# Patient Record
Sex: FEMALE | Race: WHITE | NOT HISPANIC OR LATINO | Employment: OTHER | URBAN - METROPOLITAN AREA
[De-identification: names, ages, dates, MRNs, and addresses within clinical notes are randomized per-mention and may not be internally consistent; named-entity substitution may affect disease eponyms.]

---

## 2017-04-03 ENCOUNTER — COMMUNICATION - HEALTHEAST (OUTPATIENT)
Dept: INTERNAL MEDICINE | Facility: CLINIC | Age: 72
End: 2017-04-03

## 2017-04-03 DIAGNOSIS — E78.5 HYPERLIPIDEMIA: ICD-10-CM

## 2017-04-04 ENCOUNTER — TELEPHONE (OUTPATIENT)
Dept: NEUROLOGY | Facility: CLINIC | Age: 72
End: 2017-04-04

## 2017-04-04 DIAGNOSIS — G21.8 OTHER SECONDARY PARKINSONISM (H): ICD-10-CM

## 2017-04-04 NOTE — TELEPHONE ENCOUNTER
----- Message from Junie Cuenca LPN sent at 3/29/2017  8:48 AM CDT -----  Regarding: New sx  Contact: [phone]  Caller name:Reinaldo Saucedo [phone]    Treating provider/specialty:Lawrence    Nurse:    Best time to return call:    Message left?     Description of issue/question:  sx wt loss -155#; husb approx her wt at 130# now; syncope past yr.   Lethargy and slurred speech past yr;   In the past couple wks, hyperactivity and visual hallucinations which has caused increased activity and improved speech. Able to walk with more pep with  around the house now.   Talking to imaginary people and children. More manic but agitated when  doesn't take care of the imaginary little girl.   No change in C/L doses.    The only risk is when  leaves the house for errands, he is worried that she will be unsupervised plus up and about on her own.  Please address.    Has appt 5/4/2017 2:10 PM Ray Bravo MD

## 2017-04-04 NOTE — TELEPHONE ENCOUNTER
carbidopa-levodopa (SINEMET CR)  MG per tablet 45 tablet 3 2016  No   Sig: Take 1/2 tab at midnight     carbidopa-levodopa (SINEMET)  MG per tablet 540 tablet 3 2016  No   Si.5 TAB at 9AM, 1pm, and 5PM and  Take 1 to 1.5 tablets @ 9pm     donepezil (ARICEPT) 10 MG tablet 90 tablet 11 2016  No   Sig: Take  by mouth. 1 tab at 9pm     melatonin 3 MG tablet 90 tablet 3 2016  --   Si tablet at 9pm     pramipexole (MIRAPEX) 1 MG tablet 180 tablet 3 2016  No   Sig: Take one half tab (0.5mg) at 9am, 1pm and 5pm     QUEtiapine (SEROQUEL) 25 MG tablet 135 tablet 3 2016  No   Si/2 tab @ 1pm and 1 tablet at 12 midnight     selegiline (ELDEPRYL) 5 MG capsule 180 capsule 3 2016  No   Si tab @ 7am, 1 tab @ 1pm     Called  and LVM to call me with a good time to reach him tomorrow. I want to verify medications.

## 2017-04-07 RX ORDER — PRAMIPEXOLE DIHYDROCHLORIDE 1 MG/1
TABLET ORAL
Qty: 1 TABLET | Refills: 0 | COMMUNITY
Start: 2017-04-07 | End: 2017-07-11 | Stop reason: SINTOL

## 2017-04-07 NOTE — TELEPHONE ENCOUNTER
LVM for patient's  with the following instructions:    pramipexole (MIRAPEX) 1 MG tablet 1 tablet 0 4/7/2017  No   Sig: Take 1/2 tab at 9 am and 1 pm for 5 days, then 1/2 tab at 9 am for 5 days, then stop.     Asked him to call me back letting me know he got these instructions.

## 2017-04-13 NOTE — TELEPHONE ENCOUNTER
----- Message from Junie Cuenca LPN sent at 4/7/2017 10:19 AM CDT -----  Regarding: pt update  Caller name:    Treating provider/specialty:Lawrence    Nurse:    Best time to return call:    Message left?FYI only     Description of issue/question:  Pt better-no manic behavior now but still hallucinating.   understanding taper instructions.

## 2017-04-26 NOTE — TELEPHONE ENCOUNTER
I called Reinaldo, at Perlita's request, to follow up and see how Jerica is doing now having tapered off the pramipexole, but was there was no answer; I left [facility] asking for him to call back and let our triage nurse know when I can reach him back to discuss, or leave a message with our triage nurse to get over to our team.    Franci Ingram, MS RN Care Coordinator

## 2017-04-26 NOTE — TELEPHONE ENCOUNTER
"Reinaldo called us back advising that he would be available at any time to talk; I called Reinaldo back to get an update on Jerica; He said that Jerica's hallucinations have disappeared, if not almost gone completely; While she was tapering down the pramipexole, he started to notice improvement, but that she also had more \"pep\"; During the tapering, she also had a period of more agitation, which he said is better now, and he described it more like mood swings; Reinaldo said that ever since Dr Bravo put Jerica on the \"BP medication\", she hasn't been fainting anymore; She also apparently walked all the way upstairs in their home on her own, which she hasn't done in a long time; Jerica is scheduled to see Dr Bravo next week on May 4th, and they are planning on coming; Reinaldo is most worried about trying to get her in the Dr Hunter, as that has apparently been an issue in getting her to the eye doctor and dentist, and they had to cancel last minute; Reinaldo also wanted us to make note that Jerica's speech is more clear now; Not all the time, and is a little worse when she is tired, but wanted Dr Bravo to know that; He had no further questions at this time.    Franci Ingram, MS RN Care Coordinator  "

## 2017-05-08 ENCOUNTER — TELEPHONE (OUTPATIENT)
Dept: NEUROLOGY | Facility: CLINIC | Age: 72
End: 2017-05-08

## 2017-05-08 NOTE — TELEPHONE ENCOUNTER
Called patient's  Uday and let him know that I received his voice mail last week. Advised him to call the scheduling line to reschedule patient's appointment before September as Dr. Bravo needs to see her yearly in order to write prescriptions. She should be off of the pramipexole by now and having improvement with hallucinations and fatigue.

## 2017-05-22 ENCOUNTER — COMMUNICATION - HEALTHEAST (OUTPATIENT)
Dept: CARDIOLOGY | Facility: CLINIC | Age: 72
End: 2017-05-22

## 2017-05-22 DIAGNOSIS — I95.1 PARKINSONIAN SYNDROME ASSOCIATED WITH SYMPTOMATIC ORTHOSTATIC HYPOTENSION (H): ICD-10-CM

## 2017-05-22 DIAGNOSIS — G20.C PARKINSONIAN SYNDROME ASSOCIATED WITH SYMPTOMATIC ORTHOSTATIC HYPOTENSION (H): ICD-10-CM

## 2017-05-30 ENCOUNTER — OFFICE VISIT (OUTPATIENT)
Dept: NEUROLOGY | Facility: CLINIC | Age: 72
End: 2017-05-30

## 2017-05-30 ENCOUNTER — RECORDS - HEALTHEAST (OUTPATIENT)
Dept: ADMINISTRATIVE | Facility: OTHER | Age: 72
End: 2017-05-30

## 2017-05-30 VITALS
BODY MASS INDEX: 25.16 KG/M2 | OXYGEN SATURATION: 97 % | DIASTOLIC BLOOD PRESSURE: 50 MMHG | HEIGHT: 65 IN | HEART RATE: 74 BPM | WEIGHT: 151 LBS | RESPIRATION RATE: 20 BRPM | SYSTOLIC BLOOD PRESSURE: 84 MMHG

## 2017-05-30 DIAGNOSIS — G20.A1 PARALYSIS AGITANS (H): ICD-10-CM

## 2017-05-30 DIAGNOSIS — G20.A1 PARKINSON DISEASE (H): ICD-10-CM

## 2017-05-30 DIAGNOSIS — G20.A1 PARKINSON'S DISEASE (H): ICD-10-CM

## 2017-05-30 RX ORDER — CARBIDOPA AND LEVODOPA 50; 200 MG/1; MG/1
TABLET, EXTENDED RELEASE ORAL
Qty: 45 TABLET | Refills: 3 | Status: SHIPPED | OUTPATIENT
Start: 2017-05-30 | End: 2017-11-30

## 2017-05-30 RX ORDER — CARBIDOPA AND LEVODOPA 25; 100 MG/1; MG/1
TABLET ORAL
Qty: 540 TABLET | Refills: 3 | Status: SHIPPED | OUTPATIENT
Start: 2017-05-30 | End: 2017-11-30

## 2017-05-30 RX ORDER — DONEPEZIL HYDROCHLORIDE 10 MG/1
TABLET, FILM COATED ORAL
Qty: 90 TABLET | Refills: 11 | Status: SHIPPED | OUTPATIENT
Start: 2017-05-30 | End: 2017-11-30

## 2017-05-30 RX ORDER — LANOLIN ALCOHOL/MO/W.PET/CERES
CREAM (GRAM) TOPICAL
Qty: 90 TABLET | Refills: 3 | Status: SHIPPED | OUTPATIENT
Start: 2017-05-30 | End: 2017-11-30

## 2017-05-30 RX ORDER — QUETIAPINE FUMARATE 25 MG/1
TABLET, FILM COATED ORAL
Qty: 135 TABLET | Refills: 3 | Status: SHIPPED | OUTPATIENT
Start: 2017-05-30 | End: 2017-11-30

## 2017-05-30 ASSESSMENT — PAIN SCALES - GENERAL: PAINLEVEL: NO PAIN (0)

## 2017-05-30 NOTE — Clinical Note
2017       RE: Jerica Blas  [address] [facility] [address]     Dear Colleague,    Thank you for referring your patient, Jerica Blas, to the [facility] NEUROLOGY at [facility]. Please see a copy of my visit note below.    Diagnosis/Summary/Recommendations:    PATIENT: Jerica Blas    : 1945    PANKAJ: May 30, 2017    Parkinsonism  Cognitive impairment      ______________________________________    Cc: 71 year old female   Issues discussed today May 30, 2017       -       Over 50% of this visit was spent in patient care and care coordination.     History obtained from patient and family    Total visit time was 25 minutes      Ray Bravo MD     ______________________________________    Last visit date and details:      Parkinsonism  Sinemet 25/100: 1.5 tbs @ 7a, 11a, 4pm, and 1-1.5 tabs @7pm  Sinemet 50/200:  1/2 at bedtime   mirapex 1mg 1/2 tab @ 7a, 11 and 4pm  Selegiline 5mg in am and noon.     Cognitive disorder  Donepezil 10mg /day     Hallucinations  seroquel 25mg 1/2 tab @ 11a and 1 tab @ 10pm     On proamatine/midodrine  She is on 2.5mg three times per day   She had a very low  bp today when seated  She ha had orthostatic drops when seen by Dr. Villagomez  122/70 seated and 70/50 standing.                 Fabby Logan, [facility] at 10/31/2016  2:26 PM       Status: Signed              Expand All Collapse All    Social Work:  D/I: Request to contact Pt's dtr Lily re home care services. Lily indicated that her father was paying for retirement home care (HHA) to provide respite care while he went fishing or golfing during the summer. It is too costly for him to continue it thru the winter. She wondered if Medicare would pay for this. Discussed skilled home care criteria under Medicare coverage. She has not had this before and may benefit from it at some time, [facility] PT/OT. Discussed the limitations of Medicare coverage home care and that most people  who need detention care (bathing dressing grooming, mobility, etc) either need to pay for it from their own savings or apply to services thru their county to have it funded thru the [facility]. Discussed getting a Long Term Care consultation visit at home. Discussed [employer] and they are over assets for that program. She inquired about whether I could contact her father to discuss this. Dtr reported that she and her sibling both have young children and it's difficult for them to help in the care of Jerica.  I contacted Reinaldo. Acknowledged the challenges in 24/7 caregiving. We discussed home care funding and he was aware of not qualifying for funding and is trying to preserve their finances for the future. They have some services from [facility] home care that he used this summer and it came with a nurse who visited and did check Jerica's blood pressure. Discussed the LTC assessment thru [facility] to get the asset assessment done and clarity on qualifications for any programs. He plans to wait for now. Discussed Medicare home care and that they could use it when needed in the future for skilled visits.  A/P: Caregiver fatigue. He has to determine how best to use their funds.  He has my contact info and encouraged him to contact me as needed in the future.                SUMMARY  She continues to be relatively stable in regards to her mobility     Her blood pressure seems to be better managed with her proamatine 2.5mg 3/day  Discussed other options for blood pressure if needed  A. Fluid bolus of 8oz or 16oz before activities  B. Extra proamatine if needed in the morning or mid day  C. Mestinon add on  D. Florinef/fludrocortisone  Her  will decide if he will see nephrologist Dr. Yuan Mcdonald about her BP/renal issues.      At this point she and her  seem okay with the present medication regimen and is not wanting to change thins at this poin     Her regards to mobility she will remain on her sinemet,  sinemet CR, mirapex and selegiline  If there are significant changes in function we may consider backing down off the mirapex in the future. And consider having her go off the selegiline. But we both agreed to keep the regimen the same     In regards to her hallucinations these continue but are not troubling and she is on seroquel and not needing to have a dose increase. We discussed nuplazid/pimavanserin as a recently approved medication for these issues.      There is some respite one day a week during the summer so he can go fishing.     Return back in 6 months.      Ray Bravo MD        ______________________________________      Patient was asked about 14 Review of systems including changes in vision (dry eyes, double vision), hearing, heart, lungs, musculoskeletal, depression, anxiety, snoring, RBD, insomnia, urinary frequency, urinary urgency, constipation, swallowing problems, hematological, ID, allergies, skin problems: seborrhea, endocrinological: thyroid, diabetes, cholesterol; balance, weight changes, and other neurological problems and these were not significant at this time except for   Patient Active Problem List   Diagnosis     Parkinsonism (H)     Dementia     Crohn disease (H)     Fistula     Nocturia     REM sleep behavior disorder     Over weight     Vitamin D deficiency     Wears glasses     Glaucoma     Hypercholesteremia     Constipation     Finger fracture     Foot fracture     Confusion     Bunion     Cracked skin     Hand pain     Macrocytosis     Chronic renal disease          Allergies   Allergen Reactions     Demerol      Hay Fever & [A.R.M.]      Namenda [Memantine Hydrochloride]      No benefit       Requip [Ropinirole Hydrochloride]      ? confusion     Past Surgical History:   Procedure Laterality Date     BIOPSY OF SKIN LESION  october 2014    seborrheic keratosis of left face     EYE SURGERY  jan 2012    left cataract     EYE SURGERY  march 2012    right cataract     fistula  repair?       Past Medical History:   Diagnosis Date     Bunion 3/27/2012     Chronic renal disease 2013     Confusion 2011     Constipation     1/week     Constipation 2011     Cracked skin 3/27/2012     Crohn disease (H) 2011     Crohn's     mercaptopurine     Disorientation     denies hallucinations. Has memory problems     Entero-colonic fistula     related to crohns; [facility]; had a prior one      Falls     once every 3 months     Finger fracture 2011     Finger fracture, right      Fistula 2011     Foot fracture      Freezing      Glaucoma     uses travatan and timolol     Glaucoma 2011     Hand pain 3/27/2012     Hypercholesteremia 2011     Hypercholesterolaemia     on simvastatin     Macrocytosis 2013     Nocturia     1-3/noc     Over weight     with pd has gained 55 lbs     Parkinson's disease     left side onset; left handed. dyskinesias     REM sleep behavior disorder     not frequent     Screening colonoscopy      Vitamin D deficiency      Wears glasses      Social History     Social History     Marital status:      Spouse name: N/A     Number of children: N/A     Years of education: N/A     Occupational History     Not on file.     Social History Main Topics     Smoking status: Never Smoker     Smokeless tobacco: Never Used     Alcohol use No     Drug use: Not on file     Sexual activity: Not on file     Other Topics Concern     Not on file     Social History Narrative    Living in [place]    Not exercising    No tobacco    No alcohol        FAMILY HISTORY:  Her father  in his 70s from alcoholism.  Her mother is alive and age 94.  She has two sisters and one brother who are healthy.  She has two daughters who are 33 and 35.  She is of [place] background.        SOCIAL HISTORY:  She was born in [place] and grew up in the [place] then [place].  She came to [place] after college.  She has been  since , i.e., 40  years.  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       Drug and lactation database from the [place] National Library of Medicine:  http://toxnet.nlm.nih.gov/cgi-bin/sis/htmlgen?LACT      B/P: Data Unavailable, T: Data Unavailable, P: Data Unavailable, R: Data Unavailable 0 lbs 0 oz  There were no vitals taken for this visit., There is no height or weight on file to calculate BMI.  Medications and Vitals not listed are documented in the cart and reviewed by me.     Current Outpatient Prescriptions   Medication Sig Dispense Refill     pramipexole (MIRAPEX) 1 MG tablet Take 1/2 tab at 9 am and 1 pm for 5 days, then 1/2 tab at 9 am for 5 days, then stop. 1 tablet 0     midodrine (PROAMATINE) 2.5 MG tablet 1 tab @ 9am, 1pm and 5pm 270 tablet 3     melatonin 3 MG tablet 1 tablet at 9pm 90 tablet 3     carbidopa-levodopa (SINEMET CR)  MG per tablet Take 1/2 tab at midnight 45 tablet 3     carbidopa-levodopa (SINEMET)  MG per tablet 1.5 TAB at 9AM, 1pm, and 5PM and  Take 1 to 1.5 tablets @ 9pm 540 tablet 3     QUEtiapine (SEROQUEL) 25 MG tablet 1/2 tab @ 1pm and 1 tablet at 12 midnight 135 tablet 3     donepezil (ARICEPT) 10 MG tablet Take  by mouth. 1 tab at 9pm 90 tablet 11     selegiline (ELDEPRYL) 5 MG capsule 1 tab @ 7am, 1 tab @ 1pm 180 capsule 3     Urea (CARMOL 40) 40 % CREA Externally apply  topically. Apply on cracked skin as directed - 1 Tube 11     sennosides (SENNA) 8.6 MG tablet Take 1 tablet by mouth daily as needed for constipation. 100 tablet 3     Calcium Carb-Cholecalciferol (CALCIUM 500 +D PO) Take 1 tablet by mouth daily.       simvastatin (ZOCOR) 20 MG tablet Take 0.5 tablets by mouth At Bedtime.       cholecalciferol (VITAMIN D) 1000 UNIT tablet Take 3 tablets by mouth daily.       mercaptopurine (PURINETHOL) 50 MG tablet Take 1 tablet by mouth daily. 90 tablet 3         Ray Bravo MD    Again, thank you for allowing me to participate  in the care of your patient.      Sincerely,    Ray Bravo MD

## 2017-05-30 NOTE — PATIENT INSTRUCTIONS
Diagnosis/Summary/Recommendations:    PATIENT: Jerica Blas    : 1945    PANKAJ: May 30, 2017    Parkinsonism  Sinemet 50/200 1/2 tab at midnight  Sinemet 25/100  1.5 TABS @9-10AM, 1.5 TABS @1-2PM AND 1.5 TABS @5-6PM AND 1 TAB @ 9-10PM   Selegiline 5MG - 2 TABS @ 9-10AM   mirapex 1mg WEANED OFF THIS.     Cognitive impairment  Donepezil 10mg  AT 9-10PM    Orthostatic hypotension  Proamatine/midodrine 2.5mg 3/day @ 9AM, 5PM AND 12 MIDNIGHT.     Hallucinations  seroquel 25mg 1 /2 TAB @ 1-2PM AND 1 TAB @ MIDNIGHT.     Cholesterol  Simvastatin 1/2 TAB AT BEDTIME    MERCAPTOPURINE 50MG IN THE MORNING.     She has had spells where she stops with swallowing beverages.     Plan  Change the midodrine at midnight to 1-2pm so it will be 1 tab @ 9-10am, 1 tab @ 1-2pm and 1 tab @ 5-6pm    Secondly change the selegiline from 2 tabs @ 9-10am to 1 tab @ 9-10am and 1 tab @ 1-2pm    No other changes  Remain off the mirapex  Continue the other medications as written.     Return back in 6 months.    Probably has a lipoma on the back of her neck.     Ray garcia MD     _______

## 2017-05-30 NOTE — MR AVS SNAPSHOT
After Visit Summary   2017    Jerica Blas    MRN: [id number]           Patient Information     Date Of Birth          1945        Visit Information        Provider Department      2017 2:40 PM Ray Bravo MD [facility] Neurology        Today's Diagnoses     Paralysis agitans (H)        Parkinson's disease (H)        Parkinson disease (H)          Care Instructions    Diagnosis/Summary/Recommendations:    PATIENT: Jerica Blas    : 1945    PANKAJ: May 30, 2017    Parkinsonism  Sinemet 50/200 1/2 tab at midnight  Sinemet 25/100  1.5 TABS @9-10AM, 1.5 TABS @1-2PM AND 1.5 TABS @5-6PM AND 1 TAB @ 9-10PM   Selegiline 5MG - 2 TABS @ 9-10AM   mirapex 1mg WEANED OFF THIS.     Cognitive impairment  Donepezil 10mg  AT 9-10PM    Orthostatic hypotension  Proamatine/midodrine 2.5mg 3/day @ 9AM, 5PM AND 12 MIDNIGHT.     Hallucinations  seroquel 25mg 1 /2 TAB @ 1-2PM AND 1 TAB @ MIDNIGHT.     Cholesterol  Simvastatin 1/2 TAB AT BEDTIME    MERCAPTOPURINE 50MG IN THE MORNING.     She has had spells where she stops with swallowing beverages.     Plan  Change the midodrine at midnight to 1-2pm so it will be 1 tab @ 9-10am, 1 tab @ 1-2pm and 1 tab @ 5-6pm    Secondly change the selegiline from 2 tabs @ 9-10am to 1 tab @ 9-10am and 1 tab @ 1-2pm    No other changes  Remain off the mirapex  Continue the other medications as written.     Return back in 6 months.    Probably has a lipoma on the back of her neck.     Ray bravo MD     _______            Follow-ups after your visit        Follow-up notes from your care team     Return in about 6 months (around 2017).      Your next 10 appointments already scheduled     2017  2:10 PM CST   (Arrive by 1:55 PM)   Return Movement Disorder with Ray Bravo MD   [facility] ([facility])    [address] [place] [id number] [phone]              Who to contact     Please  "call your clinic at [phone] to:    Ask questions about your health    Make or cancel appointments    Discuss your medicines    Learn about your test results    Speak to your doctor   If you have compliments or concerns about an experience at your clinic, or if you wish to file a complaint, please contact [facility] Patient Relations at [phone] or email us at [email or web address]         Additional Information About Your Visit        .comhart Information     Pay[employer]ilt gives you secure access to your electronic health record. If you see a primary care provider, you can also send messages to your care team and make appointments. If you have questions, please call your primary care clinic.  If you do not have a primary care provider, please call [phone] and they will assist you.      Jemstep is an electronic gateway that provides easy, online access to your medical records. With Jemstep, you can request a clinic appointment, read your test results, renew a prescription or communicate with your care team.     To access your existing account, please contact your [facility] or call [phone] for assistance.        Care EveryWhere ID     This is your Care EveryWhere ID. This could be used by other organizations to access your [facility] medical records  [id number]        Your Vitals Were     Pulse Respirations Height Pulse Oximetry Breastfeeding? BMI (Body Mass Index)    74 20 1.651 m (5' 5\") 97% No 25.13 kg/m2       Blood Pressure from Last 3 Encounters:   05/30/17 (!) 84/50   11/01/16 (!) 78/50   04/22/16 (!) 80/44    Weight from Last 3 Encounters:   05/30/17 68.5 kg (151 lb)   04/22/16 71 kg (156 lb 9.6 oz)   10/22/15 81.6 kg (180 lb)              Today, you had the following     No orders found for display         Where to get your medicines      These medications were sent to [id number] - [place], [place] - [address]  [address], [facility] [address]    Hours:  Ok's by kailee GUPTA 9/20/06 Phone:  [phone]     carbidopa-levodopa  MG per tablet    carbidopa-levodopa  MG per CR tablet    donepezil 10 MG tablet    QUEtiapine 25 MG tablet         Some of these will need a paper prescription and others can be bought over the counter.  Ask your nurse if you have questions.     Bring a paper prescription for each of these medications     melatonin 3 MG tablet          Primary Care Provider Office Phone # Fax #    Faizan Villagomez [phone]       [facility] [address] [facility] [address]  [facility] [address]        Thank you!     Thank you for choosing [facility] NEUROLOGY  for your care. Our goal is always to provide you with excellent care. Hearing back from our patients is one way we can continue to improve our services. Please take a few minutes to complete the written survey that you may receive in the mail after your visit with us. Thank you!             Your Updated Medication List - Protect others around you: Learn how to safely use, store and throw away your medicines at www.disposemymeds.org.          This list is accurate as of: 5/30/17  3:16 PM.  Always use your most recent med list.                   Brand Name Dispense Instructions for use    CALCIUM 500 +D PO      Take 1 tablet by mouth daily.       * carbidopa-levodopa  MG per tablet    SINEMET    540 tablet    1.5 TAB at 9AM, 1pm, and 5PM and  Take 1 to 1.5 tablets @ 9pm       * carbidopa-levodopa  MG per CR tablet    SINEMET CR    45 tablet    Take 1/2 tab at midnight       cholecalciferol 1000 UNIT tablet    vitamin D     Take 3 tablets by mouth daily.       donepezil 10 MG tablet    ARICEPT    90 tablet    Take  by mouth. 1 tab at 9pm       melatonin 3 MG tablet     90 tablet    1 tablet at 9pm       mercaptopurine 50 MG tablet CHEMO    PURINETHOL    90 tablet    Take 1 tablet by mouth daily.        midodrine 2.5 MG tablet    PROAMATINE    270 tablet    1 tab @ 9am, 1pm and 5pm       pramipexole 1 MG tablet    MIRAPEX    1 tablet    Take 1/2 tab at 9 am and 1 pm for 5 days, then 1/2 tab at 9 am for 5 days, then stop.       QUEtiapine 25 MG tablet    SEROQUEL    135 tablet    1/2 tab @ 1pm and 1 tablet at 12 midnight       selegiline 5 MG capsule    ELDEPRYL    180 capsule    1 tab @ 7am, 1 tab @ 1pm       sennosides 8.6 MG tablet    SENOKOT    100 tablet    Take 1 tablet by mouth daily as needed for constipation.       Urea 40 % Crea    CARMOL 40    1 Tube    Externally apply  topically. Apply on cracked skin as directed -       ZOCOR 20 MG tablet   Generic drug:  simvastatin      Take 0.5 tablets by mouth At Bedtime.       * Notice:  This list has 2 medication(s) that are the same as other medications prescribed for you. Read the directions carefully, and ask your doctor or other care provider to review them with you.

## 2017-05-30 NOTE — PROGRESS NOTES
Diagnosis/Summary/Recommendations:    PATIENT: Jerica Blas    : 1945    PANKAJ: May 30, 2017    Parkinsonism  Sinemet 50/200 1/2 tab at midnight  Sinemet 25/100  1.5 TABS @9-10AM, 1.5 TABS @1-2PM AND 1.5 TABS @5-6PM AND 1 TAB @ 9-10PM   Selegiline 5MG - 2 TABS @ 9-10AM   mirapex 1mg WEANED OFF THIS.     Cognitive impairment  Donepezil 10mg  AT 9-10PM    Orthostatic hypotension  Proamatine/midodrine 2.5mg 3/day @ 9AM, 5PM AND 12 MIDNIGHT.     Hallucinations  seroquel 25mg 1 /2 TAB @ 1-2PM AND 1 TAB @ MIDNIGHT.     Cholesterol  Simvastatin 1/2 TAB AT BEDTIME    MERCAPTOPURINE 50MG IN THE MORNING.     She has had spells where she stops with swallowing beverages.     Plan  Change the midodrine at midnight to 1-2pm so it will be 1 tab @ 9-10am, 1 tab @ 1-2pm and 1 tab @ 5-6pm    Secondly change the selegiline from 2 tabs @ 9-10am to 1 tab @ 9-10am and 1 tab @ 1-2pm    No other changes  Remain off the mirapex  Continue the other medications as written.     Return back in 6 months.    Probably has a lipoma on the back of her neck.     Ray bravo MD     ______________________________________    Cc: 71 year old female   Issues discussed today May 30, 2017       -     Answers for HPI/ROS submitted by the patient on 2017   General Symptoms: No  Skin Symptoms: No  HENT Symptoms: No  EYE SYMPTOMS: No  HEART SYMPTOMS: No  LUNG SYMPTOMS: No  INTESTINAL SYMPTOMS: No  URINARY SYMPTOMS: No  GYNECOLOGIC SYMPTOMS: No  BREAST SYMPTOMS: No  SKELETAL SYMPTOMS: No  BLOOD SYMPTOMS: No  NERVOUS SYSTEM SYMPTOMS: No  MENTAL HEALTH SYMPTOMS: No          Over 50% of this visit was spent in patient care and care coordination.     History obtained from patient and family    Total visit time was 25 minutes      Ray Bravo MD     ______________________________________    Last visit date and details:      Parkinsonism  Sinemet 25/100: 1.5 tbs @ 7a, 11a, 4pm, and 1-1.5 tabs @7pm  Sinemet 50/200:  1/2 at bedtime   mirapex 1mg  tab @  7a, 11 and 4pm  Selegiline 5mg in am and noon.     Cognitive disorder  Donepezil 10mg /day     Hallucinations  seroquel 25mg 1/2 tab @ 11a and 1 tab @ 10pm     On proamatine/midodrine  She is on 2.5mg three times per day   She had a very low  bp today when seated  She ha had orthostatic drops when seen by Dr. Villagomez  122/70 seated and 70/50 standing.                 Fabby Logan, [facility] at 10/31/2016  2:26 PM       Status: Signed              Expand All Collapse All    Social Work:  D/I: Request to contact Pt's dtr Lily re home care services. Lily indicated that her father was paying for [facility] ([facility]) to provide respite care while he went fishing or golfing during the summer. It is too costly for him to continue it thru the winter. She wondered if Medicare would pay for this. Discussed skilled home care criteria under Medicare coverage. She has not had this before and may benefit from it at some time, [facility] home PT/OT. Discussed the limitations of Medicare coverage home care and that most people who need [facility] care (bathing dressing grooming, mobility, etc) either need to pay for it from their own savings or apply to services thru their [facility] to have it funded thru the [facility]. Discussed getting a Long Term Care consultation visit at home. Discussed [facility] and they are over assets for that program. She inquired about whether I could contact her father to discuss this. Dtr reported that she and her sibling both have young children and it's difficult for them to help in the care of Jerica.  I contacted Reinaldo. Acknowledged the challenges in 24/7 caregiving. We discussed home care funding and he was aware of not qualifying for funding and is trying to preserve their finances for the future. They have some services from [facility] home care that he used this summer and it came with a nurse who visited and did check Jerica's blood pressure. Discussed the LTC assessment thru [facility] to get the  asset assessment done and clarity on qualifications for any programs. He plans to wait for now. Discussed Medicare home care and that they could use it when needed in the future for skilled visits.  A/P: Caregiver fatigue. He has to determine how best to use their funds.  He has my contact info and encouraged him to contact me as needed in the future.                SUMMARY  She continues to be relatively stable in regards to her mobility     Her blood pressure seems to be better managed with her proamatine 2.5mg 3/day  Discussed other options for blood pressure if needed  A. Fluid bolus of 8oz or 16oz before activities  B. Extra proamatine if needed in the morning or mid day  C. Mestinon add on  D. Florinef/fludrocortisone  Her  will decide if he will see nephrologist Dr. Yuan Mcdonald about her BP/renal issues.      At this point she and her  seem okay with the present medication regimen and is not wanting to change thins at this poin     Her regards to mobility she will remain on her sinemet, sinemet CR, mirapex and selegiline  If there are significant changes in function we may consider backing down off the mirapex in the future. And consider having her go off the selegiline. But we both agreed to keep the regimen the same     In regards to her hallucinations these continue but are not troubling and she is on seroquel and not needing to have a dose increase. We discussed nuplazid/pimavanserin as a recently approved medication for these issues.      There is some respite one day a week during the summer so he can go fishing.     Return back in 6 months.      Ray Bravo MD        ______________________________________      Patient was asked about 14 Review of systems including changes in vision (dry eyes, double vision), hearing, heart, lungs, musculoskeletal, depression, anxiety, snoring, RBD, insomnia, urinary frequency, urinary urgency, constipation, swallowing problems, hematological, ID, allergies,  skin problems: seborrhea, endocrinological: thyroid, diabetes, cholesterol; balance, weight changes, and other neurological problems and these were not significant at this time except for   Patient Active Problem List   Diagnosis     Parkinsonism (H)     Dementia     Crohn disease (H)     Fistula     Nocturia     REM sleep behavior disorder     Over weight     Vitamin D deficiency     Wears glasses     Glaucoma     Hypercholesteremia     Constipation     Finger fracture     Foot fracture     Confusion     Bunion     Cracked skin     Hand pain     Macrocytosis     Chronic renal disease          Allergies   Allergen Reactions     Demerol      Hay Fever & [A.R.M.]      Namenda [Memantine Hydrochloride]      No benefit       Requip [Ropinirole Hydrochloride]      ? confusion     Past Surgical History:   Procedure Laterality Date     BIOPSY OF SKIN LESION  october 2014    seborrheic keratosis of left face     EYE SURGERY  jan 2012    left cataract     EYE SURGERY  march 2012    right cataract     fistula repair?       Past Medical History:   Diagnosis Date     Bunion 3/27/2012     Chronic renal disease 9/24/2013     Confusion 9/6/2011     Constipation     1/week     Constipation 9/6/2011     Cracked skin 3/27/2012     Crohn disease (H) 9/2/2011     Crohn's     mercaptopurine     Disorientation     denies hallucinations. Has memory problems     Entero-colonic fistula 2000    related to crohns; [facility]; had a prior one      Falls     once every 3 months     Finger fracture 9/6/2011     Finger fracture, right      Fistula 9/2/2011     Foot fracture      Freezing      Glaucoma     uses travatan and timolol     Glaucoma 9/6/2011     Hand pain 3/27/2012     Hypercholesteremia 9/6/2011     Hypercholesterolaemia     on simvastatin     Macrocytosis 9/24/2013     Nocturia     1-3/noc     Over weight     with pd has gained 55 lbs     Parkinson's disease 1998    left side onset; left handed. dyskinesias     REM sleep behavior  disorder     not frequent     Screening colonoscopy      Vitamin D deficiency      Wears glasses      Social History     Social History     Marital status:      Spouse name: N/A     Number of children: N/A     Years of education: N/A     Occupational History     Not on file.     Social History Main Topics     Smoking status: Never Smoker     Smokeless tobacco: Never Used     Alcohol use No     Drug use: Not on file     Sexual activity: Not on file     Other Topics Concern     Not on file     Social History Narrative    Living in [place]    Not exercising    No tobacco    No alcohol        FAMILY HISTORY:  Her father  in his 70s from alcoholism.  Her mother is alive and age 94.  She has two sisters and one brother who are healthy.  She has two daughters who are 33 and 35.  She is of [place] background.        SOCIAL HISTORY:  She was born in [place] and grew up in the [place] and then [place].  She came to [place] after college.  She has been  since , i.e., 39 years.  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       Drug and lactation database from the [place] National Library of Medicine:  http://toxnet.nlm.nih.gov/cgi-bin/sis/htmlgen?LACT      B/P: Data Unavailable, T: Data Unavailable, P: Data Unavailable, R: Data Unavailable 0 lbs 0 oz  There were no vitals taken for this visit., There is no height or weight on file to calculate BMI.  Medications and Vitals not listed are documented in the cart and reviewed by me.     Current Outpatient Prescriptions   Medication Sig Dispense Refill     pramipexole (MIRAPEX) 1 MG tablet Take 1/2 tab at 9 am and 1 pm for 5 days, then 1/2 tab at 9 am for 5 days, then stop. 1 tablet 0     midodrine (PROAMATINE) 2.5 MG tablet 1 tab @ 9am, 1pm and 5pm 270 tablet 3     melatonin 3 MG tablet 1 tablet at 9pm 90 tablet 3     carbidopa-levodopa (SINEMET CR)  MG per tablet Take 1/2 tab at  midnight 45 tablet 3     carbidopa-levodopa (SINEMET)  MG per tablet 1.5 TAB at 9AM, 1pm, and 5PM and  Take 1 to 1.5 tablets @ 9pm 540 tablet 3     QUEtiapine (SEROQUEL) 25 MG tablet 1/2 tab @ 1pm and 1 tablet at 12 midnight 135 tablet 3     donepezil (ARICEPT) 10 MG tablet Take  by mouth. 1 tab at 9pm 90 tablet 11     selegiline (ELDEPRYL) 5 MG capsule 1 tab @ 7am, 1 tab @ 1pm 180 capsule 3     Urea (CARMOL 40) 40 % CREA Externally apply  topically. Apply on cracked skin as directed - 1 Tube 11     sennosides (SENNA) 8.6 MG tablet Take 1 tablet by mouth daily as needed for constipation. 100 tablet 3     Calcium Carb-Cholecalciferol (CALCIUM 500 +D PO) Take 1 tablet by mouth daily.       simvastatin (ZOCOR) 20 MG tablet Take 0.5 tablets by mouth At Bedtime.       cholecalciferol (VITAMIN D) 1000 UNIT tablet Take 3 tablets by mouth daily.       mercaptopurine (PURINETHOL) 50 MG tablet Take 1 tablet by mouth daily. 90 tablet 3         Ray Bravo MD

## 2017-06-05 ENCOUNTER — OFFICE VISIT - HEALTHEAST (OUTPATIENT)
Dept: INTERNAL MEDICINE | Facility: CLINIC | Age: 72
End: 2017-06-05

## 2017-06-05 DIAGNOSIS — G20.C PARKINSONIAN SYNDROME ASSOCIATED WITH SYMPTOMATIC ORTHOSTATIC HYPOTENSION (H): ICD-10-CM

## 2017-06-05 DIAGNOSIS — I95.1 PARKINSONIAN SYNDROME ASSOCIATED WITH SYMPTOMATIC ORTHOSTATIC HYPOTENSION (H): ICD-10-CM

## 2017-06-05 ASSESSMENT — MIFFLIN-ST. JEOR: SCORE: 1181.28

## 2017-06-06 LAB
CHOLEST SERPL-MCNC: 173 MG/DL
FASTING STATUS PATIENT QL REPORTED: NORMAL
HDLC SERPL-MCNC: 66 MG/DL
LDLC SERPL CALC-MCNC: 85 MG/DL
TRIGL SERPL-MCNC: 111 MG/DL

## 2017-06-08 ENCOUNTER — COMMUNICATION - HEALTHEAST (OUTPATIENT)
Dept: INTERNAL MEDICINE | Facility: CLINIC | Age: 72
End: 2017-06-08

## 2017-06-27 ENCOUNTER — COMMUNICATION - HEALTHEAST (OUTPATIENT)
Dept: CARDIOLOGY | Facility: CLINIC | Age: 72
End: 2017-06-27

## 2017-06-27 DIAGNOSIS — G20.C PARKINSONIAN SYNDROME ASSOCIATED WITH SYMPTOMATIC ORTHOSTATIC HYPOTENSION (H): ICD-10-CM

## 2017-06-27 DIAGNOSIS — I95.1 PARKINSONIAN SYNDROME ASSOCIATED WITH SYMPTOMATIC ORTHOSTATIC HYPOTENSION (H): ICD-10-CM

## 2017-06-28 ENCOUNTER — COMMUNICATION - HEALTHEAST (OUTPATIENT)
Dept: INTERNAL MEDICINE | Facility: CLINIC | Age: 72
End: 2017-06-28

## 2017-07-11 DIAGNOSIS — G20.C PARKINSONISM (H): Primary | ICD-10-CM

## 2017-07-14 ENCOUNTER — TELEPHONE (OUTPATIENT)
Dept: NEUROLOGY | Facility: CLINIC | Age: 72
End: 2017-07-14

## 2017-07-14 DIAGNOSIS — F43.21 ADJUSTMENT DISORDER WITH DEPRESSED MOOD: Primary | ICD-10-CM

## 2017-07-14 RX ORDER — CITALOPRAM HYDROBROMIDE 10 MG/1
TABLET ORAL
Qty: 30 TABLET | Refills: 11 | Status: SHIPPED | OUTPATIENT
Start: 2017-07-14 | End: 2017-09-19 | Stop reason: SINTOL

## 2017-07-14 NOTE — TELEPHONE ENCOUNTER
Called  back to follow up.    He would like her to start an antidepression medication. Dr. Bravo did recommend starting citalopram. I will request he write this order and send to [facility] in [place]. Discussed the possible side effects below. He will contact me within 2 weeks to let me know how she is doing.    Call your doctor right away if you notice any of these side effects:  Allergic reaction: Itching or hives, swelling in your face or hands, swelling or tingling in your mouth or throat, chest tightness, trouble breathing  Anxiety, restlessness, fever, sweating, muscle spasms, nausea, vomiting, diarrhea, seeing or hearing things that are not there  Chest pain, trouble breathing  Confusion, weakness, and muscle twitching  Fast, pounding, or uneven heartbeat  Feeling more excited or energetic than usual, trouble sleeping, racing thoughts  Eye pain, vision changes, seeing halos around lights  Lightheadedness, dizziness, or fainting  Thoughts of hurting yourself or others, unusual behavior  Unusual bleeding or bruising    If you notice these less serious side effects, talk with your doctor:  Dry mouth, mild nausea  Sexual problems  Sleepiness or drowsiness  -----------

## 2017-07-19 ENCOUNTER — COMMUNICATION - HEALTHEAST (OUTPATIENT)
Dept: INTERNAL MEDICINE | Facility: CLINIC | Age: 72
End: 2017-07-19

## 2017-07-22 ENCOUNTER — MYC MEDICAL ADVICE (OUTPATIENT)
Dept: NEUROLOGY | Facility: CLINIC | Age: 72
End: 2017-07-22

## 2017-07-24 NOTE — TELEPHONE ENCOUNTER
Called , Uday, back. Told him to stay at 1/2 tab of the citalopram for one more week before increasing and to let us know how it goes. He verbalized understanding and agreement with this plan.    Patient's blood pressure was fine today.

## 2017-07-28 ENCOUNTER — COMMUNICATION - HEALTHEAST (OUTPATIENT)
Dept: CARDIOLOGY | Facility: CLINIC | Age: 72
End: 2017-07-28

## 2017-07-28 DIAGNOSIS — I95.1 PARKINSONIAN SYNDROME ASSOCIATED WITH SYMPTOMATIC ORTHOSTATIC HYPOTENSION (H): ICD-10-CM

## 2017-07-28 DIAGNOSIS — G20.C PARKINSONIAN SYNDROME ASSOCIATED WITH SYMPTOMATIC ORTHOSTATIC HYPOTENSION (H): ICD-10-CM

## 2017-08-01 ENCOUNTER — COMMUNICATION - HEALTHEAST (OUTPATIENT)
Dept: INTERNAL MEDICINE | Facility: CLINIC | Age: 72
End: 2017-08-01

## 2017-08-01 DIAGNOSIS — I95.1 PARKINSONIAN SYNDROME ASSOCIATED WITH SYMPTOMATIC ORTHOSTATIC HYPOTENSION (H): ICD-10-CM

## 2017-08-01 DIAGNOSIS — G20.C PARKINSONIAN SYNDROME ASSOCIATED WITH SYMPTOMATIC ORTHOSTATIC HYPOTENSION (H): ICD-10-CM

## 2017-08-07 ENCOUNTER — HOSPITAL ENCOUNTER (OUTPATIENT)
Dept: MAMMOGRAPHY | Facility: CLINIC | Age: 72
Discharge: HOME | End: 2017-08-07
Attending: INTERNAL MEDICINE

## 2017-08-07 DIAGNOSIS — Z12.31 VISIT FOR SCREENING MAMMOGRAM: ICD-10-CM

## 2017-08-19 ENCOUNTER — COMMUNICATION - HEALTHEAST (OUTPATIENT)
Dept: INTERNAL MEDICINE | Facility: CLINIC | Age: 72
End: 2017-08-19

## 2017-08-19 DIAGNOSIS — K50.118 CROHN'S COLITIS, OTHER COMPLICATION (H): ICD-10-CM

## 2017-08-29 ENCOUNTER — MYC MEDICAL ADVICE (OUTPATIENT)
Dept: NEUROLOGY | Facility: CLINIC | Age: 72
End: 2017-08-29

## 2017-09-05 ENCOUNTER — COMMUNICATION - HEALTHEAST (OUTPATIENT)
Dept: INTERNAL MEDICINE | Facility: CLINIC | Age: 72
End: 2017-09-05

## 2017-09-05 DIAGNOSIS — I95.1 PARKINSONIAN SYNDROME ASSOCIATED WITH SYMPTOMATIC ORTHOSTATIC HYPOTENSION (H): ICD-10-CM

## 2017-09-05 DIAGNOSIS — G20.C PARKINSONIAN SYNDROME ASSOCIATED WITH SYMPTOMATIC ORTHOSTATIC HYPOTENSION (H): ICD-10-CM

## 2017-09-19 ENCOUNTER — MYC MEDICAL ADVICE (OUTPATIENT)
Dept: NEUROLOGY | Facility: CLINIC | Age: 72
End: 2017-09-19

## 2017-09-19 DIAGNOSIS — G20.A1 PARALYSIS AGITANS (H): Primary | ICD-10-CM

## 2017-09-28 DIAGNOSIS — G20.A1 PARKINSON'S DISEASE (H): ICD-10-CM

## 2017-09-29 RX ORDER — SELEGILINE HYDROCHLORIDE 5 MG/1
CAPSULE ORAL
Qty: 180 CAPSULE | Refills: 2 | Status: SHIPPED | OUTPATIENT
Start: 2017-09-29 | End: 2017-11-30

## 2017-10-03 ENCOUNTER — COMMUNICATION - HEALTHEAST (OUTPATIENT)
Dept: INTERNAL MEDICINE | Facility: CLINIC | Age: 72
End: 2017-10-03

## 2017-10-03 DIAGNOSIS — I95.1 PARKINSONIAN SYNDROME ASSOCIATED WITH SYMPTOMATIC ORTHOSTATIC HYPOTENSION (H): ICD-10-CM

## 2017-10-03 DIAGNOSIS — G20.C PARKINSONIAN SYNDROME ASSOCIATED WITH SYMPTOMATIC ORTHOSTATIC HYPOTENSION (H): ICD-10-CM

## 2017-10-17 ENCOUNTER — MYC MEDICAL ADVICE (OUTPATIENT)
Dept: NEUROLOGY | Facility: CLINIC | Age: 72
End: 2017-10-17

## 2017-10-18 DIAGNOSIS — G20.A1 PARKINSON'S DISEASE (H): Primary | ICD-10-CM

## 2017-10-18 RX ORDER — ESCITALOPRAM OXALATE 5 MG/1
TABLET ORAL
Qty: 30 TABLET | Refills: 11 | Status: SHIPPED | OUTPATIENT
Start: 2017-10-18 | End: 2017-10-25 | Stop reason: SINTOL

## 2017-10-24 DIAGNOSIS — I95.1 ORTHOSTATIC HYPOTENSION: ICD-10-CM

## 2017-10-24 RX ORDER — MIDODRINE HYDROCHLORIDE 2.5 MG/1
TABLET ORAL
Qty: 450 TABLET | Refills: 3 | COMMUNITY
Start: 2017-10-24 | End: 2017-11-30

## 2017-10-31 ENCOUNTER — COMMUNICATION - HEALTHEAST (OUTPATIENT)
Dept: INTERNAL MEDICINE | Facility: CLINIC | Age: 72
End: 2017-10-31

## 2017-10-31 DIAGNOSIS — I95.1 PARKINSONIAN SYNDROME ASSOCIATED WITH SYMPTOMATIC ORTHOSTATIC HYPOTENSION (H): ICD-10-CM

## 2017-10-31 DIAGNOSIS — G20.C PARKINSONIAN SYNDROME ASSOCIATED WITH SYMPTOMATIC ORTHOSTATIC HYPOTENSION (H): ICD-10-CM

## 2017-11-28 ENCOUNTER — COMMUNICATION - HEALTHEAST (OUTPATIENT)
Dept: INTERNAL MEDICINE | Facility: CLINIC | Age: 72
End: 2017-11-28

## 2017-11-28 DIAGNOSIS — I95.1 PARKINSONIAN SYNDROME ASSOCIATED WITH SYMPTOMATIC ORTHOSTATIC HYPOTENSION (H): ICD-10-CM

## 2017-11-28 DIAGNOSIS — G20.C PARKINSONIAN SYNDROME ASSOCIATED WITH SYMPTOMATIC ORTHOSTATIC HYPOTENSION (H): ICD-10-CM

## 2017-11-29 NOTE — PROGRESS NOTES
Diagnosis/Summary/Recommendations:    PATIENT: Jerica Blas  72 year old female     : 1945    PANKAJ: 2017    Parkinson    Calcium carbonate cholecalciferol @ 9-10am  Sinemet 50/200 1/2 tab at midnight - 1am  Sinemet 25/100  1.5 TABS @9-10AM, 1.5 TABS @1-2PM AND 1.5 TABS @5-6PM AND 1 TAB @ 9-10PM   Selegiline 5MG - 1 TABS @ 9-10AM and 1 @ 1-2pm  mirapex 1mg WEANED OFF THIS.     Cholecalciferol 1000 3 tabs per day @ 9-10am    Cognitive impairment  Donepezil aricept 10mg  AT 9-10PM     Orthostatic hypotension  Proamatine/midodrine 2.5mg  1tab @ 9-10AM, 1-2pm 5-6PM      Hallucinations  Quetiapine seroquel 25mg 1 /2 TAB @ 1-2 PM AND 1 TAB @ MIDNIGHT-1am.      Cholesterol  Simvastatin 20mg 1/2 TAB @ midnight -1am     MERCAPTOPURINE 50MG @ 9-10am    Melatonin 3mg at 9-10pm    Sennosides senna - not taking     Urea carmol 40% for cracked skin - not taking    Blood pressure has been okay  116/60 today  Weight recorded.   Other visits are elsewhere.    She tried depression pills which aggravated her blood pressure in that they dropped her pressure and she had orthostatic symptoms with faints.   She has sun downing about 4 pm presently - gets some agitation.     She continues to have some rem sleep issues. She is on 3mg of melatonin and she may need more melatonin if this is an issues  She has had falls.       Meds                   9-10am 1-2pm 5-6pm 9-10p Midnight-1am                                    Sinemet 25/100                  1.5 1.5 1.5 1      Sinemet CR 50/200                             1/2     Selegiline eldepryl 5mg                           1 1        mirapex Not taking         quetiapine seroquel 25mg  1/2   1     Simvastatin 20mg     1/2     Mercaptopurine 50mg 1         Melatonin 3mg    1      sennosides Not taking         Urea carmol lotion not taking         Midodrine proamatine 2.5mg 1 1 1       Calcium carbonate 1         Cholecalciferol 1000 3         Donepezil aricept 10mg     1                            Over 50% of this visit was spent in patient care and care coordination.     History obtained from patient    Total visit time was 25 minutes    For now we will not change her medication regimen.    Return back in 6 months.     Ray Bravo MD     ______________________________________    Last visit date and details:     PATIENT: Jerica Blas     : 1945     PANKAJ: May 30, 2017     Parkinsonism  Sinemet 50/200 1/2 tab at midnight  Sinemet 25/100  1.5 TABS @9-10AM, 1.5 TABS @1-2PM AND 1.5 TABS @5-6PM AND 1 TAB @ 9-10PM   Selegiline 5MG - 2 TABS @ 9-10AM   mirapex 1mg WEANED OFF THIS.      Cognitive impairment  Donepezil 10mg  AT 9-10PM     Orthostatic hypotension  Proamatine/midodrine 2.5mg 3/day @ 9AM, 5PM AND 12 MIDNIGHT.      Hallucinations  seroquel 25mg 1 /2 TAB @ 1-2PM AND 1 TAB @ MIDNIGHT.      Cholesterol  Simvastatin 1/2 TAB AT BEDTIME     MERCAPTOPURINE 50MG IN THE MORNING.      She has had spells where she stops with swallowing beverages.      Plan  Change the midodrine at midnight to 1-2pm so it will be 1 tab @ 9-10am, 1 tab @ 1-2pm and 1 tab @ 5-6pm     Secondly change the selegiline from 2 tabs @ 9-10am to 1 tab @ 9-10am and 1 tab @ 1-2pm     No other changes  Remain off the mirapex  Continue the other medications as written.      Return back in 6 months.     Probably has a lipoma on the back of her neck.      Ray bravo MD         ______________________________________      Patient was asked about 14 Review of systems including changes in vision (dry eyes, double vision), hearing, heart, lungs, musculoskeletal, depression, anxiety, snoring, RBD, insomnia, urinary frequency, urinary urgency, constipation, swallowing problems, hematological, ID, allergies, skin problems: seborrhea, endocrinological: thyroid, diabetes, cholesterol; balance, weight changes, and other neurological problems and these were not significant at this time except for   Patient Active Problem List    Diagnosis     Parkinsonism (H)     Dementia     Crohn disease (H)     Fistula     Nocturia     REM sleep behavior disorder     Over weight     Vitamin D deficiency     Wears glasses     Glaucoma     Hypercholesteremia     Constipation     Finger fracture     Foot fracture     Confusion     Bunion     Cracked skin     Hand pain     Macrocytosis     Chronic renal disease          Allergies   Allergen Reactions     Citalopram Other (See Comments)     Fainting     Sertraline Other (See Comments)     Fainting     Demerol      Hay Fever & [A.R.M.]      Mirapex [Pramipexole] Other (See Comments)     Hallucinations     Namenda [Memantine Hydrochloride]      No benefit       Requip [Ropinirole Hydrochloride]      ? confusion     Past Surgical History:   Procedure Laterality Date     BIOPSY OF SKIN LESION  october 2014    seborrheic keratosis of left face     EYE SURGERY  jan 2012    left cataract     EYE SURGERY  march 2012    right cataract     fistula repair?       Past Medical History:   Diagnosis Date     Bunion 3/27/2012     Chronic renal disease 9/24/2013     Confusion 9/6/2011     Constipation     1/week     Constipation 9/6/2011     Cracked skin 3/27/2012     Crohn disease (H) 9/2/2011     Crohn's     mercaptopurine     Disorientation     denies hallucinations. Has memory problems     Entero-colonic fistula 2000    related to crohns; [facility]; had a prior one      Falls     once every 3 months     Finger fracture 9/6/2011     Finger fracture, right      Fistula 9/2/2011     Foot fracture      Freezing      Glaucoma     uses travatan and timolol     Glaucoma 9/6/2011     Hand pain 3/27/2012     Hypercholesteremia 9/6/2011     Hypercholesterolaemia     on simvastatin     Macrocytosis 9/24/2013     Nocturia     1-3/noc     Over weight     with pd has gained 55 lbs     Parkinson's disease 1998    left side onset; left handed. dyskinesias     REM sleep behavior disorder     not frequent     Screening colonoscopy 2011      Vitamin D deficiency      Wears glasses      Social History     Social History     Marital status:      Spouse name: N/A     Number of children: N/A     Years of education: N/A     Occupational History     Not on file.     Social History Main Topics     Smoking status: Never Smoker     Smokeless tobacco: Never Used     Alcohol use No     Drug use: Not on file     Sexual activity: Not Currently     Other Topics Concern     Not on file     Social History Narrative    Living in [place]    Not exercising    No tobacco    No alcohol        FAMILY HISTORY:  Her father  in his 70s from alcoholism.  Her mother is alive and age 94.  She has two sisters and one brother who are healthy.  She has two daughters who are 33 and 35.  She is of [place] background.        SOCIAL HISTORY:  She was born in [place] and grew up in the [place] then [place].  She came to [place] after college.  She has been  since , i.e., 39 years.  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       Drug and lactation database from the [place] National Library of Medicine:  http://toxnet.nlm.nih.gov/cgi-bin/sis/htmlgen?LACT                  B/P: Data Unavailable, T: Data Unavailable, P: Data Unavailable, R: Data Unavailable 0 lbs 0 oz  not currently breastfeeding., There is no height or weight on file to calculate BMI.  Medications and Vitals not listed above were documented in the cart and reviewed by me.     Current Outpatient Prescriptions   Medication Sig Dispense Refill     midodrine (PROAMATINE) 2.5 MG tablet 3 tabs @ 9am,  3 tabs @ 1pm and 1 tab @ 5pm 450 tablet 3     selegiline (ELDEPRYL) 5 MG capsule Take 1 capsule by mouth at 7am, and then take 1 tablet by mouth at 1pm 180 capsule 2     carbidopa-levodopa (SINEMET)  MG per tablet 1.5 TAB at 9AM, 1pm, and 5PM and  Take 1 to 1.5 tablets @ 9pm 540 tablet 3     donepezil (ARICEPT) 10 MG tablet  Take  by mouth. 1 tab at 9pm 90 tablet 11     carbidopa-levodopa (SINEMET CR)  MG per CR tablet Take 1/2 tab at midnight 45 tablet 3     melatonin 3 MG tablet 1 tablet at 9pm 90 tablet 3     QUEtiapine (SEROQUEL) 25 MG tablet 1/2 tab @ 1pm and 1 tablet at 12 midnight 135 tablet 3     Urea (CARMOL 40) 40 % CREA Externally apply  topically. Apply on cracked skin as directed - 1 Tube 11     sennosides (SENNA) 8.6 MG tablet Take 1 tablet by mouth daily as needed for constipation. 100 tablet 3     Calcium Carb-Cholecalciferol (CALCIUM 500 +D PO) Take 1 tablet by mouth daily.       simvastatin (ZOCOR) 20 MG tablet Take 0.5 tablets by mouth At Bedtime.       cholecalciferol (VITAMIN D) 1000 UNIT tablet Take 3 tablets by mouth daily.       mercaptopurine (PURINETHOL) 50 MG tablet Take 1 tablet by mouth daily. 90 tablet 3         Ray Bravo MD

## 2017-11-30 ENCOUNTER — RECORDS - HEALTHEAST (OUTPATIENT)
Dept: ADMINISTRATIVE | Facility: OTHER | Age: 72
End: 2017-11-30

## 2017-11-30 ENCOUNTER — OFFICE VISIT (OUTPATIENT)
Dept: NEUROLOGY | Facility: CLINIC | Age: 72
End: 2017-11-30

## 2017-11-30 VITALS — WEIGHT: 151 LBS | BODY MASS INDEX: 25.16 KG/M2 | HEIGHT: 65 IN

## 2017-11-30 DIAGNOSIS — G20.A1 PARKINSON DISEASE (H): ICD-10-CM

## 2017-11-30 DIAGNOSIS — G20.A1 PARKINSON'S DISEASE (H): Primary | ICD-10-CM

## 2017-11-30 DIAGNOSIS — I95.1 ORTHOSTATIC HYPOTENSION: ICD-10-CM

## 2017-11-30 DIAGNOSIS — G20.A1 PARALYSIS AGITANS (H): ICD-10-CM

## 2017-11-30 PROBLEM — G20.C: Status: ACTIVE | Noted: 2017-11-30

## 2017-11-30 RX ORDER — MIDODRINE HYDROCHLORIDE 2.5 MG/1
TABLET ORAL
Qty: 450 TABLET | Refills: 3 | Status: SHIPPED | OUTPATIENT
Start: 2017-11-30 | End: 2018-06-08

## 2017-11-30 RX ORDER — CARBIDOPA AND LEVODOPA 25; 100 MG/1; MG/1
TABLET ORAL
Qty: 540 TABLET | Refills: 3 | Status: SHIPPED | OUTPATIENT
Start: 2017-11-30 | End: 2018-06-08

## 2017-11-30 RX ORDER — SELEGILINE HYDROCHLORIDE 5 MG/1
CAPSULE ORAL
Qty: 180 CAPSULE | Refills: 2 | Status: SHIPPED | OUTPATIENT
Start: 2017-11-30 | End: 2018-06-08

## 2017-11-30 RX ORDER — MERCAPTOPURINE 50 MG/1
TABLET ORAL
Qty: 90 TABLET | Refills: 3 | COMMUNITY
Start: 2017-11-30 | End: 2019-06-27

## 2017-11-30 RX ORDER — QUETIAPINE FUMARATE 25 MG/1
TABLET, FILM COATED ORAL
Qty: 135 TABLET | Refills: 3 | Status: SHIPPED | OUTPATIENT
Start: 2017-11-30 | End: 2018-06-08

## 2017-11-30 RX ORDER — SIMVASTATIN 20 MG
TABLET ORAL
Qty: 90 TABLET | Refills: 3 | COMMUNITY
Start: 2017-11-30 | End: 2019-06-27

## 2017-11-30 RX ORDER — DONEPEZIL HYDROCHLORIDE 10 MG/1
TABLET, FILM COATED ORAL
Qty: 90 TABLET | Refills: 11 | Status: SHIPPED | OUTPATIENT
Start: 2017-11-30 | End: 2018-06-08

## 2017-11-30 RX ORDER — LANOLIN ALCOHOL/MO/W.PET/CERES
CREAM (GRAM) TOPICAL
Qty: 90 TABLET | Refills: 3 | COMMUNITY
Start: 2017-11-30 | End: 2019-06-27

## 2017-11-30 RX ORDER — CARBIDOPA AND LEVODOPA 50; 200 MG/1; MG/1
TABLET, EXTENDED RELEASE ORAL
Qty: 45 TABLET | Refills: 3 | Status: SHIPPED | OUTPATIENT
Start: 2017-11-30 | End: 2018-06-08

## 2017-11-30 ASSESSMENT — PAIN SCALES - GENERAL: PAINLEVEL: NO PAIN (0)

## 2017-11-30 NOTE — PATIENT INSTRUCTIONS
Diagnosis/Summary/Recommendations:    PATIENT: Jerica Blas  72 year old female     : 1945    PANKAJ: 2017    Parkinson    Calcium carbonate cholecalciferol @ 9-10am  Sinemet 50/200 1/2 tab at midnight - 1am  Sinemet 25/100  1.5 TABS @9-10AM, 1.5 TABS @1-2PM AND 1.5 TABS @5-6PM AND 1 TAB @ 9-10PM   Selegiline 5MG - 1 TABS @ 9-10AM and 1 @ 1-2pm  mirapex 1mg WEANED OFF THIS.     Cholecalciferol 1000 3 tabs per day @ 9-10am    Cognitive impairment  Donepezil aricept 10mg  AT 9-10PM     Orthostatic hypotension  Proamatine/midodrine 2.5mg  1tab @ 9-10AM, 1-2pm 5-6PM      Hallucinations  Quetiapine seroquel 25mg 1 /2 TAB @ 1-2 PM AND 1 TAB @ MIDNIGHT-1am.      Cholesterol  Simvastatin 20mg 1/2 TAB @ midnight -1am     MERCAPTOPURINE 50MG @ 9-10am    Melatonin 3mg at 9-10pm    Sennosides senna - not taking     Urea carmol 40% for cracked skin - not taking    Blood pressure has been okay  116/60 today  Weight recorded.   Other visits are elsewhere.    She tried depression pills which aggravated her blood pressure in that they dropped her pressure and she had orthostatic symptoms with faints.   She has sun downing about 4 pm presently - gets some agitation.     She continues to have some rem sleep issues. She is on 3mg of melatonin and she may need more melatonin if this is an issues  She has had falls.       Meds                   9-10am 1-2pm 5-6pm 9-10p Midnight-1am                                    Sinemet 25/100                  1.5 1.5 1.5 1      Sinemet CR 50/200                             1/2     Selegiline eldepryl 5mg                           1 1        mirapex Not taking         quetiapine seroquel 25mg  1/2   1     Simvastatin 20mg     1/2     Mercaptopurine 50mg 1         Melatonin 3mg    1      sennosides Not taking         Urea carmol lotion not taking         Midodrine proamatine 2.5mg 1 1 1       Calcium carbonate 1         Cholecalciferol 1000 3         Donepezil aricept 10mg     1                            Over 50% of this visit was spent in patient care and care coordination.     History obtained from patient    Total visit time was 25 minutes    For now we will not change her medication regimen.    Return back in 6 months.     Ray Bravo MD

## 2017-11-30 NOTE — LETTER
2017      RE: Jerica Blas  [address] [facility] [address]       Diagnosis/Summary/Recommendations:    PATIENT: Jerica Blas  72 year old female     : 1945    PANKAJ: 2017    Parkinson    Calcium carbonate cholecalciferol @ 9-10am  Sinemet 50/200 1/2 tab at midnight - 1am  Sinemet 25/100  1.5 TABS @9-10AM, 1.5 TABS @1-2PM AND 1.5 TABS @5-6PM AND 1 TAB @ 9-10PM   Selegiline 5MG - 1 TABS @ 9-10AM and 1 @ 1-2pm  mirapex 1mg WEANED OFF THIS.     Cholecalciferol 1000 3 tabs per day @ 9-10am    Cognitive impairment  Donepezil aricept 10mg  AT 9-10PM     Orthostatic hypotension  Proamatine/midodrine 2.5mg  1tab @ 9-10AM, 1-2pm 5-6PM      Hallucinations  Quetiapine seroquel 25mg 1 /2 TAB @ 1-2 PM AND 1 TAB @ MIDNIGHT-1am.      Cholesterol  Simvastatin 20mg 1/2 TAB @ midnight -1am     MERCAPTOPURINE 50MG @ 9-10am    Melatonin 3mg at 9-10pm    Sennosides senna - not taking     Urea carmol 40% for cracked skin - not taking    Blood pressure has been okay  116/60 today  Weight recorded.   Other visits are elsewhere.    She tried depression pills which aggravated her blood pressure in that they dropped her pressure and she had orthostatic symptoms with faints.   She has sun downing about 4 pm presently - gets some agitation.     She continues to have some rem sleep issues. She is on 3mg of melatonin and she may need more melatonin if this is an issues  She has had falls.       Meds                   9-10am 1-2pm 5-6pm 9-10p Midnight-1am                                    Sinemet 25/100                  1.5 1.5 1.5 1      Sinemet CR 50/200                             1/2     Selegiline eldepryl 5mg                           1 1        mirapex Not taking         quetiapine seroquel 25mg  1/2   1     Simvastatin 20mg     1/2     Mercaptopurine 50mg 1         Melatonin 3mg    1      sennosides Not taking         Urea carmol lotion not taking         Midodrine proamatine 2.5mg 1 1 1        Calcium carbonate 1         Cholecalciferol 1000 3         Donepezil aricept 10mg     1                           Over 50% of this visit was spent in patient care and care coordination.     History obtained from patient    Total visit time was 25 minutes    For now we will not change her medication regimen.    Return back in 6 months.     Ray Bravo MD     ______________________________________    Last visit date and details:     PATIENT: Jerica Blas     : 1945     PANKAJ: May 30, 2017     Parkinsonism  Sinemet 50/200 1/2 tab at midnight  Sinemet 25/100  1.5 TABS @9-10AM, 1.5 TABS @1-2PM AND 1.5 TABS @5-6PM AND 1 TAB @ 9-10PM   Selegiline 5MG - 2 TABS @ 9-10AM   mirapex 1mg WEANED OFF THIS.      Cognitive impairment  Donepezil 10mg  AT 9-10PM     Orthostatic hypotension  Proamatine/midodrine 2.5mg 3/day @ 9AM, 5PM AND 12 MIDNIGHT.      Hallucinations  seroquel 25mg 1 /2 TAB @ 1-2PM AND 1 TAB @ MIDNIGHT.      Cholesterol  Simvastatin 1/2 TAB AT BEDTIME     MERCAPTOPURINE 50MG IN THE MORNING.      She has had spells where she stops with swallowing beverages.      Plan  Change the midodrine at midnight to 1-2pm so it will be 1 tab @ 9-10am, 1 tab @ 1-2pm and 1 tab @ 5-6pm     Secondly change the selegiline from 2 tabs @ 9-10am to 1 tab @ 9-10am and 1 tab @ 1-2pm     No other changes  Remain off the mirapex  Continue the other medications as written.      Return back in 6 months.     Probably has a lipoma on the back of her neck.      Ray bravo MD          ______________________________________      Patient was asked about 14 Review of systems including changes in vision (dry eyes, double vision), hearing, heart, lungs, musculoskeletal, depression, anxiety, snoring, RBD, insomnia, urinary frequency, urinary urgency, constipation, swallowing problems, hematological, ID, allergies, skin problems: seborrhea, endocrinological: thyroid, diabetes, cholesterol; balance, weight changes, and other neurological  problems and these were not significant at this time except for   Patient Active Problem List   Diagnosis     Parkinsonism (H)     Dementia     Crohn disease (H)     Fistula     Nocturia     REM sleep behavior disorder     Over weight     Vitamin D deficiency     Wears glasses     Glaucoma     Hypercholesteremia     Constipation     Finger fracture     Foot fracture     Confusion     Bunion     Cracked skin     Hand pain     Macrocytosis     Chronic renal disease          Allergies   Allergen Reactions     Citalopram Other (See Comments)     Fainting     Sertraline Other (See Comments)     Fainting     Demerol      Hay Fever & [A.R.M.]      Mirapex [Pramipexole] Other (See Comments)     Hallucinations     Namenda [Memantine Hydrochloride]      No benefit       Requip [Ropinirole Hydrochloride]      ? confusion     Past Surgical History:   Procedure Laterality Date     BIOPSY OF SKIN LESION  october 2014    seborrheic keratosis of left face     EYE SURGERY  jan 2012    left cataract     EYE SURGERY  march 2012    right cataract     fistula repair?       Past Medical History:   Diagnosis Date     Bunion 3/27/2012     Chronic renal disease 9/24/2013     Confusion 9/6/2011     Constipation     1/week     Constipation 9/6/2011     Cracked skin 3/27/2012     Crohn disease (H) 9/2/2011     Crohn's     mercaptopurine     Disorientation     denies hallucinations. Has memory problems     Entero-colonic fistula 2000    related to crohns; [facility]; had a prior one      Falls     once every 3 months     Finger fracture 9/6/2011     Finger fracture, right      Fistula 9/2/2011     Foot fracture      Freezing      Glaucoma     uses travatan and timolol     Glaucoma 9/6/2011     Hand pain 3/27/2012     Hypercholesteremia 9/6/2011     Hypercholesterolaemia     on simvastatin     Macrocytosis 9/24/2013     Nocturia     1-3/noc     Over weight     with pd has gained 55 lbs     Parkinson's disease 1998    left side onset; left handed.  dyskinesias     REM sleep behavior disorder     not frequent     Screening colonoscopy      Vitamin D deficiency      Wears glasses      Social History     Social History     Marital status:      Spouse name: N/A     Number of children: N/A     Years of education: N/A     Occupational History     Not on file.     Social History Main Topics     Smoking status: Never Smoker     Smokeless tobacco: Never Used     Alcohol use No     Drug use: Not on file     Sexual activity: Not Currently     Other Topics Concern     Not on file     Social History Narrative    Living in [place]    Not exercising    No tobacco    No alcohol        FAMILY HISTORY:  Her father  in his 70s from alcoholism.  Her mother is alive and age 94.  She has two sisters and one brother who are healthy.  She has two daughters who are 33 and 35.  She is of [place] background.        SOCIAL HISTORY:  She was born in [place] and grew up in the [place] and then [place].  She came to [place] after college.  She has been  since , i.e., 39 years.  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       Drug and lactation database from the [place] National Library of Medicine:  http://toxnet.nlm.nih.gov/cgi-bin/sis/htmlgen?LACT                  B/P: Data Unavailable, T: Data Unavailable, P: Data Unavailable, R: Data Unavailable 0 lbs 0 oz  not currently breastfeeding., There is no height or weight on file to calculate BMI.  Medications and Vitals not listed above were documented in the cart and reviewed by me.     Current Outpatient Prescriptions   Medication Sig Dispense Refill     midodrine (PROAMATINE) 2.5 MG tablet 3 tabs @ 9am,  3 tabs @ 1pm and 1 tab @ 5pm 450 tablet 3     selegiline (ELDEPRYL) 5 MG capsule Take 1 capsule by mouth at 7am, and then take 1 tablet by mouth at 1pm 180 capsule 2     carbidopa-levodopa (SINEMET)  MG per tablet 1.5 TAB at 9AM, 1pm,  and 5PM and  Take 1 to 1.5 tablets @ 9pm 540 tablet 3     donepezil (ARICEPT) 10 MG tablet Take  by mouth. 1 tab at 9pm 90 tablet 11     carbidopa-levodopa (SINEMET CR)  MG per CR tablet Take 1/2 tab at midnight 45 tablet 3     melatonin 3 MG tablet 1 tablet at 9pm 90 tablet 3     QUEtiapine (SEROQUEL) 25 MG tablet 1/2 tab @ 1pm and 1 tablet at 12 midnight 135 tablet 3     Urea (CARMOL 40) 40 % CREA Externally apply  topically. Apply on cracked skin as directed - 1 Tube 11     sennosides (SENNA) 8.6 MG tablet Take 1 tablet by mouth daily as needed for constipation. 100 tablet 3     Calcium Carb-Cholecalciferol (CALCIUM 500 +D PO) Take 1 tablet by mouth daily.       simvastatin (ZOCOR) 20 MG tablet Take 0.5 tablets by mouth At Bedtime.       cholecalciferol (VITAMIN D) 1000 UNIT tablet Take 3 tablets by mouth daily.       mercaptopurine (PURINETHOL) 50 MG tablet Take 1 tablet by mouth daily. 90 tablet 3         Ray Bravo MD

## 2017-11-30 NOTE — MR AVS SNAPSHOT
After Visit Summary   2017    Jerica Blas    MRN: [id number]           Patient Information     Date Of Birth          1945        Visit Information        Provider Department      2017 2:10 PM Ray Bravo MD [facility] Neurology        Today's Diagnoses     Parkinson's disease (H)    -  1    Paralysis agitans (H)        Parkinson disease (H)        Orthostatic hypotension          Care Instructions    Diagnosis/Summary/Recommendations:    PATIENT: Jerica Blas  72 year old female     : 1945    PANKAJ: 2017    Parkinson    Calcium carbonate cholecalciferol @ 9-10am  Sinemet 50/200 1/2 tab at midnight - 1am  Sinemet 25/100  1.5 TABS @9-10AM, 1.5 TABS @1-2PM AND 1.5 TABS @5-6PM AND 1 TAB @ 9-10PM   Selegiline 5MG - 1 TABS @ 9-10AM and 1 @ 1-2pm  mirapex 1mg WEANED OFF THIS.     Cholecalciferol 1000 3 tabs per day @ 9-10am    Cognitive impairment  Donepezil aricept 10mg  AT 9-10PM     Orthostatic hypotension  Proamatine/midodrine 2.5mg  1tab @ 9-10AM, 1-2pm 5-6PM      Hallucinations  Quetiapine seroquel 25mg 1 /2 TAB @ 1-2 PM AND 1 TAB @ MIDNIGHT-1am.      Cholesterol  Simvastatin 20mg 1/2 TAB @ midnight -1am     MERCAPTOPURINE 50MG @ 9-10am    Melatonin 3mg at 9-10pm    Sennosides senna - not taking     Urea carmol 40% for cracked skin - not taking    Blood pressure has been okay  116/60 today  Weight recorded.   Other visits are elsewhere.    She tried depression pills which aggravated her blood pressure in that they dropped her pressure and she had orthostatic symptoms with faints.   She has sun downing about 4 pm presently - gets some agitation.     She continues to have some rem sleep issues. She is on 3mg of melatonin and she may need more melatonin if this is an issues  She has had falls.       Meds                   9-10am 1-2pm 5-6pm 9-10p Midnight-1am                                    Sinemet 25/100                  1.5 1.5 1.5 1      Sinemet CR  50/200                             1/2     Selegiline eldepryl 5mg                           1 1        mirapex Not taking         quetiapine seroquel 25mg  1/2   1     Simvastatin 20mg     1/2     Mercaptopurine 50mg 1         Melatonin 3mg    1      sennosides Not taking         Urea carmol lotion not taking         Midodrine proamatine 2.5mg 1 1 1       Calcium carbonate 1         Cholecalciferol 1000 3         Donepezil aricept 10mg     1                           Over 50% of this visit was spent in patient care and care coordination.     History obtained from patient    Total visit time was 25 minutes    For now we will not change her medication regimen.    Return back in 6 months.     Ray Bravo MD           Follow-ups after your visit        Follow-up notes from your care team     Return in about 6 months (around 5/30/2018).      Your next 10 appointments already scheduled     Nov 29, 2018  2:10 PM CST   (Arrive by 1:55 PM)   Return Movement Disorder with Ray Bravo MD   [facility] Neurology ([facility])    [address] [place] [address] [phone]              Who to contact     Please call your clinic at [phone] to:    Ask questions about your health    Make or cancel appointments    Discuss your medicines    Learn about your test results    Speak to your doctor   If you have compliments or concerns about an experience at your clinic, or if you wish to file a complaint, please contact [facility] Patient Relations at [phone] or email us at [email or web address]         Additional Information About Your Visit        MyChart Information     [employer] gives you secure access to your electronic health record. If you see a primary care provider, you can also send messages to your care team and make appointments. If you have questions, please call your primary care clinic.  If you do not have a primary care  "provider, please call [phone] and they will assist you.      [employer] is an electronic gateway that provides easy, online access to your medical records. With [employer], you can request a clinic appointment, read your test results, renew a prescription or communicate with your care team.     To access your existing account, please contact your [facility] or call [phone] for assistance.        Care EveryWhere ID     This is your Care EveryWhere ID. This could be used by other organizations to access your [facility] medical records  [id number]        Your Vitals Were     Height BMI (Body Mass Index)                1.651 m (5' 5\") 25.13 kg/m2           Blood Pressure from Last 3 Encounters:   05/30/17 (!) 84/50   11/01/16 (!) 78/50   04/22/16 (!) 80/44    Weight from Last 3 Encounters:   11/30/17 68.5 kg (151 lb)   05/30/17 68.5 kg (151 lb)   04/22/16 71 kg (156 lb 9.6 oz)              Today, you had the following     No orders found for display         Today's Medication Changes          These changes are accurate as of: 11/30/17  3:04 PM.  If you have any questions, ask your nurse or doctor.               These medicines have changed or have updated prescriptions.        Dose/Directions    calcium 500 +D 500-400 MG-UNIT Tabs   This may have changed:    - medication strength  - how much to take  - additional instructions   Generic drug:  Calcium Carb-Cholecalciferol   Changed by:  Ray Bravo MD        Take by mouth daily 1  TAb @ 9-10am   Refills:  0       cholecalciferol 1000 UNIT tablet   Commonly known as:  vitamin D3   This may have changed:    - how much to take  - how to take this  - when to take this  - additional instructions   Changed by:  Ray Bravo MD        3 tabs @ 9-10am   Quantity:  30 tablet   Refills:  0       donepezil 10 MG tablet   Commonly known as:  ARICEPT   This may have changed:  additional instructions   Used for:  Parkinson's disease " (H)   Changed by:  Ray Bravo MD        1 tab at 9-10pm   Quantity:  90 tablet   Refills:  11       melatonin 3 MG tablet   This may have changed:  additional instructions   Used for:  Paralysis agitans (H)   Changed by:  Ray Bravo MD        1 tablet at 9-10pm   Quantity:  90 tablet   Refills:  3       mercaptopurine 50 MG tablet CHEMO   Commonly known as:  PURINETHOL   This may have changed:    - how much to take  - how to take this  - when to take this  - additional instructions   Changed by:  Ray Bravo MD        1 tab @ 9-10am   Quantity:  90 tablet   Refills:  3       midodrine 2.5 MG tablet   Commonly known as:  PROAMATINE   This may have changed:  additional instructions   Used for:  Orthostatic hypotension   Changed by:  Ray Bravo MD        1 tab @ 9-10am, 1 tab @ 1-2pm and 1 tab @ 5-6pm === 3/day   Quantity:  450 tablet   Refills:  3       selegiline 5 MG capsule   Commonly known as:  ELDEPRYL   This may have changed:  See the new instructions.   Used for:  Parkinson's disease (H)   Changed by:  Ray Bravo MD        1 @9-10am and 1 @ 1-2pm  = 2/day   Quantity:  180 capsule   Refills:  2       ZOCOR 20 MG tablet   This may have changed:    - how much to take  - how to take this  - when to take this  - additional instructions   Generic drug:  simvastatin   Changed by:  Ray Bravo MD        1/2 tab @ 12midnight-1am   Quantity:  90 tablet   Refills:  3            Where to get your medicines      These medications were sent to [id number] - [place], [place] - [address]  [address] [facility] [address]    Hours:  Ok's by kailee GUPTA 9/20/06 Phone:  [phone]     carbidopa-levodopa  MG per tablet    carbidopa-levodopa  MG per CR tablet    donepezil 10 MG tablet    midodrine 2.5 MG tablet    QUEtiapine 25 MG tablet    selegiline 5 MG capsule                Primary Care Provider Office Phone # Fax #    Faizan  FABRICIO Villagomez [phone]       [facility] [address]  [facility] [address]        Equal Access to Services     RACHAEL GLORIA : Alejandra Barragan, elmirabenny royelisabethha, shellyta karosana conrad, jazmyn nelliein hayaaasha castilloramakrishna karimi laayadasha alvarado. So [facility] [phone].    ATENCIÓN: Si habla español, tiene a mariee disposición servicios gratuitos de asistencia lingüística. Llame al [phone].    We comply with applicable federal civil rights laws and [place] laws. We do not discriminate on the basis of race, color, national origin, age, disability, sex, sexual orientation, or gender identity.            Thank you!     Thank you for choosing [facility]UROLOG[facility]  for your care. Our goal is always to provide you with excellent care. Hearing back from our patients is one way we can continue to improve our services. Please take a few minutes to complete the written survey that you may receive in the mail after your visit with us. Thank you!             Your Updated Medication List - Protect others around you: Learn how to safely use, store and throw away your medicines at www.disposemymeds.org.          This list is accurate as of: 11/30/17  3:04 PM.  Always use your most recent med list.                   Brand Name Dispense Instructions for use Diagnosis    calcium 500 +D 500-400 MG-UNIT Tabs   Generic drug:  Calcium Carb-Cholecalciferol      Take by mouth daily 1  TAb @ 9-10am        * carbidopa-levodopa  MG per tablet    SINEMET    540 tablet    1.5 TAB at 9AM, 1pm, and 5PM and  Take 1 to 1.5 tablets @ 9pm    Paralysis agitans (H)       * carbidopa-levodopa  MG per CR tablet    SINEMET CR    45 tablet    Take 1/2 tab at midnight    Parkinson disease (H)       cholecalciferol 1000 UNIT tablet    vitamin D3    30 tablet    3 tabs @ 9-10am        donepezil 10 MG tablet    ARICEPT    90 tablet    1 tab at 9-10pm    Parkinson's disease (H)       melatonin 3 MG tablet     90 tablet     1 tablet at 9-10pm    Paralysis agitans (H)       mercaptopurine 50 MG tablet CHEMO    PURINETHOL    90 tablet    1 tab @ 9-10am        midodrine 2.5 MG tablet    PROAMATINE    450 tablet    1 tab @ 9-10am, 1 tab @ 1-2pm and 1 tab @ 5-6pm === 3/day    Orthostatic hypotension       QUEtiapine 25 MG tablet    SEROQUEL    135 tablet    1/2 tab @ 1pm and 1 tablet at 12 midnight    Parkinson's disease (H)       selegiline 5 MG capsule    ELDEPRYL    180 capsule    1 @9-10am and 1 @ 1-2pm  = 2/day    Parkinson's disease (H)       sennosides 8.6 MG tablet    SENOKOT    100 tablet    Take 1 tablet by mouth daily as needed for constipation.    Constipation       Urea 40 % Crea    CARMOL 40    1 Tube    Externally apply  topically. Apply on cracked skin as directed -    Cracked skin on feet       ZOCOR 20 MG tablet   Generic drug:  simvastatin     90 tablet    1/2 tab @ 12midnight-1am        * Notice:  This list has 2 medication(s) that are the same as other medications prescribed for you. Read the directions carefully, and ask your doctor or other care provider to review them with you.

## 2017-12-18 ENCOUNTER — COMMUNICATION - HEALTHEAST (OUTPATIENT)
Dept: INTERNAL MEDICINE | Facility: CLINIC | Age: 72
End: 2017-12-18

## 2017-12-19 ENCOUNTER — OFFICE VISIT - HEALTHEAST (OUTPATIENT)
Dept: INTERNAL MEDICINE | Facility: CLINIC | Age: 72
End: 2017-12-19

## 2017-12-19 ENCOUNTER — AMBULATORY - HEALTHEAST (OUTPATIENT)
Dept: LAB | Facility: CLINIC | Age: 72
End: 2017-12-19

## 2017-12-19 DIAGNOSIS — Z00.00 ROUTINE GENERAL MEDICAL EXAMINATION AT A HEALTH CARE FACILITY: ICD-10-CM

## 2017-12-19 LAB
CHOLEST SERPL-MCNC: 168 MG/DL
FASTING STATUS PATIENT QL REPORTED: YES
HDLC SERPL-MCNC: 59 MG/DL
LDLC SERPL CALC-MCNC: 98 MG/DL
TRIGL SERPL-MCNC: 57 MG/DL

## 2017-12-19 ASSESSMENT — MIFFLIN-ST. JEOR: SCORE: 1165.4

## 2017-12-21 ENCOUNTER — COMMUNICATION - HEALTHEAST (OUTPATIENT)
Dept: INTERNAL MEDICINE | Facility: CLINIC | Age: 72
End: 2017-12-21

## 2017-12-26 ENCOUNTER — COMMUNICATION - HEALTHEAST (OUTPATIENT)
Dept: INTERNAL MEDICINE | Facility: CLINIC | Age: 72
End: 2017-12-26

## 2017-12-26 DIAGNOSIS — E78.5 HYPERLIPIDEMIA: ICD-10-CM

## 2017-12-27 ENCOUNTER — AMBULATORY - HEALTHEAST (OUTPATIENT)
Dept: INTERNAL MEDICINE | Facility: CLINIC | Age: 72
End: 2017-12-27

## 2017-12-27 ENCOUNTER — COMMUNICATION - HEALTHEAST (OUTPATIENT)
Dept: INTERNAL MEDICINE | Facility: CLINIC | Age: 72
End: 2017-12-27

## 2017-12-29 ENCOUNTER — AMBULATORY - HEALTHEAST (OUTPATIENT)
Dept: NURSING | Facility: CLINIC | Age: 72
End: 2017-12-29

## 2018-05-02 ENCOUNTER — TELEPHONE (OUTPATIENT)
Dept: NEUROLOGY | Facility: CLINIC | Age: 73
End: 2018-05-02

## 2018-05-02 NOTE — TELEPHONE ENCOUNTER
"Prior Authorization Retail Medication Request   TIER EXEMPTION REQUEST!    Medication/Dose: selegiline (ELDEPRYL) 5 MG capsule, 1 capsule by mouth twice a day  ICD code (if different than what is on RX):  G20 Parkinson's disease  Previously Tried and Failed:  NA  Rationale:  She has been on this medication for years. \"Selegiline went from approximately $50 for 3 months to about $150 for 3 months , so tripled in price.  I called [employer] , which is our insurance company\"    Insurance Name:    Insurance ID:        Pharmacy Information (if different than what is on RX)  Name:    Phone:      Please try to obtain a lower tier exemption for this medication. Thank you!    "

## 2018-05-03 NOTE — TELEPHONE ENCOUNTER
Central Prior Authorization Team   Phone: [phone]    Tier Exception Initiated    Medication: selegiline (ELDEPRYL) 5 MG capsule, 1 capsule by mouth twice a day-Initiated-  Insurance Company: [employer] - Phone [phone] Fax [phone]  Pharmacy Filling the Rx: [id number] - [place], [place] - [address]  Filling Pharmacy Phone: [phone]  Filling Pharmacy Fax: [phone]  Start Date: 5/3/2018    Called Insurance at [phone] and initiated tier exception over the phone. Representative said they typically respond within 72 business hours by fax.

## 2018-05-07 ENCOUNTER — COMMUNICATION - HEALTHEAST (OUTPATIENT)
Dept: INTERNAL MEDICINE | Facility: CLINIC | Age: 73
End: 2018-05-07

## 2018-05-07 DIAGNOSIS — K50.118 CROHN'S COLITIS, OTHER COMPLICATION (H): ICD-10-CM

## 2018-05-09 ENCOUNTER — COMMUNICATION - HEALTHEAST (OUTPATIENT)
Dept: INTERNAL MEDICINE | Facility: CLINIC | Age: 73
End: 2018-05-09

## 2018-05-09 DIAGNOSIS — K50.118 CROHN'S COLITIS, OTHER COMPLICATION (H): ICD-10-CM

## 2018-05-22 NOTE — TELEPHONE ENCOUNTER
Medication Appeal Initiation    We have initiated an appeal for the requested medication:  Medication: selegiline (ELDEPRYL) 5 MG capsule, 1 capsule by mouth twice a day-Initiated-  Appeal Start Date:  5/22/2018  Insurance Company: [employer] - Phone [phone] Fax [phone]  Comments:  Tier Exception not on file as being initiated with insurance. Filled out form and faxed to insurance at [phone]    Called insurance and they did not have any paperwork regarding a tier exception on file. Filled out form and faxed to [phone]

## 2018-05-30 NOTE — TELEPHONE ENCOUNTER
MEDICATION APPEAL DENIED      Medication: selegiline (ELDEPRYL) 5 MG capsule, 1 capsule by mouth twice a day-Initiated-Denied-    Denial Date: 5/25/2018    Denial Rational: No information received as to how or if the Selegiline Tablets (Tier 2 medication), carbidopa-levadopa, or pramipexole would be ineffective, harm patient compliance, or cause an adverse reaction or harm. Tier exception for the Selegiline Capsules has been denied, however the capsules are still covered as a tier 4 medication under the patient's pharmacy benefits.                                                                                                                Second Level Appeal Information: In order to request a second level appeal, insurance will need to know why the patient can not take the formulary alternatives. A more in depth letter of medical necessity should be included with the appeal.         Second level appeals will be managed by the clinic staff and provider. Please contact the [employer] Prior Authorization Team if additional information about the denial is needed.

## 2018-06-01 ASSESSMENT — ENCOUNTER SYMPTOMS
NERVOUS/ANXIOUS: 0
DECREASED CONCENTRATION: 0
DEPRESSION: 1
PANIC: 0
INSOMNIA: 0

## 2018-06-01 NOTE — PROGRESS NOTES
Diagnosis/Summary/Recommendations:    PATIENT: Jerica Blas  72 year old female     : 1945    PANKAJ: 2018    Parkinsonism  orthosttic hypotension    Kept having low blood pressure with antidepressants  She is not being walked early in the morning  Walks 50 feet later in the day    Not taking anything for constipation  She is having a bowel movement a few times per week  Discussed this    She is wearing depends    She has hallucinations - seeing little girl and worried about her.     She has had issues between 4-6pm     Medications     9-10am 1-2pm 5-6pm 9-10pm 12-2am   Calcium carb-cholecalciferol calcium 500 + D 500+400 mg-unit 1       Carbidopa/levodopa Sinemet CR 50/200     1/2   Carbidopa/levodopa Sinemet  1.5 1.5 1.5 1    Cholecalciferol vitamin D3 1000 units 3       Donepezil aricept 10mg    1    Melatonin 3mg    1    Mercaptopurine purinethol 50mg tablet chemo 1       Midodrine proamatine 2.5mg 1 1 1     Quetiapine seroquel 25mg  1/2   1   Selegiline eldepryl 5mg 1 1      Sennosides senna 8.6mg Not taking       Simvastatin zocor 20mg     1/2   Urea carmol 40 40?% cream Not taking                                   History obtained from patient     Answers for HPI/ROS submitted by the patient on 2018   General Symptoms: No  Skin Symptoms: No  HENT Symptoms: No  EYE SYMPTOMS: No  HEART SYMPTOMS: No  LUNG SYMPTOMS: No  INTESTINAL SYMPTOMS: No  URINARY SYMPTOMS: No  GYNECOLOGIC SYMPTOMS: No  BREAST SYMPTOMS: No  SKELETAL SYMPTOMS: No  BLOOD SYMPTOMS: No  NERVOUS SYSTEM SYMPTOMS: No  MENTAL HEALTH SYMPTOMS: Yes  Nervous or Anxious: No  Depression: Yes  Trouble sleeping: No  Trouble thinking or concentrating: No  Mood changes: Yes  Panic attacks: No        They have a CNA Xi for 4 hours on Wednesday and he goes fishing  Tonia is in [place]  Lily is in [place]  One has 2 to 4  And the other has 3 under age of 11 yrs.                  Documentation of a Face-to-Face Physician  Encounter June 8, 2018    Jerica Blas  1945  [id number]    I certify that this patient is under my care and that I, or a nurse practitioner or physician's assistant working with me, had a face-to-face encounter that meets the physician face-to-face encounter requirements with this patient on: 6/8/2018.    The encounter with the patient was in whole, or in part, for the following medical condition, which is the primary reason for home health care:  Patient Active Problem List   Diagnosis     Parkinsonism (H)     Dementia     Crohn disease (H)     Fistula     Nocturia     REM sleep behavior disorder     Over weight     Vitamin D deficiency     Wears glasses     Glaucoma     Hypercholesteremia     Constipation     Finger fracture     Foot fracture     Confusion     Bunion     Cracked skin     Hand pain     Macrocytosis     Chronic renal disease     Chronic kidney disease, stage III (moderate)     Regional enteritis (H)     Lewy body dementia     Parkinsonian syndrome associated with symptomatic orthostatic hypotension (H)     Syncope       I certify that, based on my findings, the following services are medically necessary home health services: Nursing, Occupational Therapy, Physical Therapy, Speech Language Therapy and social work    My clinical findings support the need for the above services because: of parkinson    Further, I certify that my clinical findings support that this patient is homebound (i.e. absences from home require considerable and taxing effort and are for medical reasons or [facility] [facility] or infrequently or of short duration when for other reasons) because: parkinson    8th   Physician signature ____________Ray Bravo MD_______________________   June 8, 2018  Physician name: Ray Bravo MD    Fax ([phone]) or scan/email ([email or web address]) this completed document to [facility] within 24 hours of the referral date.  Questions: [phone].      Will increase the  seroquel dose from 1/2 to 1 tab @ 1-2pm to see if can help her problems around 4-6pm    Medications     9-10am 1-2pm 5-6pm 9-10pm 12-2am   Calcium carb-cholecalciferol calcium 500 + D 500+400 mg-unit 1       Carbidopa/levodopa Sinemet CR 50/200     1/2   Carbidopa/levodopa Sinemet  1.5 1.5 1.5 1    Cholecalciferol vitamin D3 1000 units 3       Donepezil aricept 10mg    1    Melatonin 3mg    1    Mercaptopurine purinethol 50mg tablet chemo 1       Midodrine proamatine 2.5mg 1 1 1     Quetiapine seroquel 25mg  1   1   Selegiline eldepryl 5mg 1 1      Sennosides senna 8.6mg Not taking       Simvastatin zocor 20mg     1/2   Urea carmol 40 40?% cream Not taking                                   Return back in 6 months.     Coding statement:   Duration of  Services: patient care and care coordination was 25 minutes  Greater than 50% of this visit was spent in counseling and coordination of care.     Ray Bravo MD     ______________________________________    Last visit date and details:     PATIENT: Jerica Blas  72 year old female      : 1945     PANKAJ: 2017     Parkinson     Calcium carbonate cholecalciferol @ 9-10am  Sinemet 50/200 1/2 tab at midnight - 1am  Sinemet 25/100  1.5 TABS @9-10AM, 1.5 TABS @1-2PM AND 1.5 TABS @5-6PM AND 1 TAB @ 9-10PM   Selegiline 5MG - 1 TABS @ 9-10AM and 1 @ 1-2pm  mirapex 1mg WEANED OFF THIS.      Cholecalciferol 1000 3 tabs per day @ 9-10am     Cognitive impairment  Donepezil aricept 10mg  AT 9-10PM      Orthostatic hypotension  Proamatine/midodrine 2.5mg  1tab @ 9-10AM, 1-2pm 5-6PM       Hallucinations  Quetiapine seroquel 25mg 1 /2 TAB @ 1-2 PM AND 1 TAB @ MIDNIGHT-1am.       Cholesterol  Simvastatin 20mg 1/2 TAB @ midnight -1am      MERCAPTOPURINE 50MG @ 9-10am     Melatonin 3mg at 9-10pm     Sennosides senna - not taking      Urea carmol 40% for cracked skin - not taking     Blood pressure has been okay  116/60 today  Weight recorded.   Other visits are  elsewhere.     She tried depression pills which aggravated her blood pressure in that they dropped her pressure and she had orthostatic symptoms with faints.   She has sun downing about 4 pm presently - gets some agitation.      She continues to have some rem sleep issues. She is on 3mg of melatonin and she may need more melatonin if this is an issues  She has had falls.         Meds                   9-10am 1-2pm 5-6pm 9-10p Midnight-1am                                  Sinemet 25/100                  1.5 1.5 1.5 1     Sinemet CR 50/200                                 1/2   Selegiline eldepryl 5mg                           1 1         mirapex Not taking           quetiapine seroquel 25mg   1/2     1   Simvastatin 20mg         1/2   Mercaptopurine 50mg 1           Melatonin 3mg       1     sennosides Not taking           Urea carmol lotion not taking           Midodrine proamatine 2.5mg 1 1 1       Calcium carbonate 1           Cholecalciferol 1000 3           Donepezil aricept 10mg         1                                  Over 50% of this visit was spent in patient care and care coordination.      History obtained from patient     Total visit time was 25 minutes     For now we will not change her medication regimen.     Return back in 6 months.      Ray Bravo MD              ______________________________________      Patient was asked about 14 Review of systems including changes in vision (dry eyes, double vision), hearing, heart, lungs, musculoskeletal, depression, anxiety, snoring, RBD, insomnia, urinary frequency, urinary urgency, constipation, swallowing problems, hematological, ID, allergies, skin problems: seborrhea, endocrinological: thyroid, diabetes, cholesterol; balance, weight changes, and other neurological problems and these were not significant at this time except for   Patient Active Problem List   Diagnosis     Parkinsonism (H)     Dementia     Crohn disease (H)     Fistula     Nocturia     REM  sleep behavior disorder     Over weight     Vitamin D deficiency     Wears glasses     Glaucoma     Hypercholesteremia     Constipation     Finger fracture     Foot fracture     Confusion     Bunion     Cracked skin     Hand pain     Macrocytosis     Chronic renal disease     Chronic kidney disease, stage III (moderate)     Regional enteritis (H)     Lewy body dementia     Parkinsonian syndrome associated with symptomatic orthostatic hypotension (H)     Syncope          Allergies   Allergen Reactions     Citalopram Other (See Comments)     Fainting     Sertraline Other (See Comments)     Fainting     Meperidine      Other reaction(s): Other (See Comments)  Confusion.     No Clinical Screening - See Comments Rash     Itchy eyes.     Demerol      Hay Fever & [A.R.M.]      Mirapex [Pramipexole] Other (See Comments)     Hallucinations     Namenda [Memantine Hydrochloride]      No benefit       Requip [Ropinirole Hydrochloride]      ? confusion     Memantine Rash     Neomycin Rash     Past Surgical History:   Procedure Laterality Date     BIOPSY OF SKIN LESION  october 2014    seborrheic keratosis of left face     EYE SURGERY  jan 2012    left cataract     EYE SURGERY  march 2012    right cataract     fistula repair?       Past Medical History:   Diagnosis Date     Bunion 3/27/2012     Chronic renal disease 9/24/2013     Confusion 9/6/2011     Constipation     1/week     Constipation 9/6/2011     Cracked skin 3/27/2012     Crohn disease (H) 9/2/2011     Crohn's     mercaptopurine     Disorientation     denies hallucinations. Has memory problems     Entero-colonic fistula 2000    related to crohns; [facility]; had a prior one      Falls     once every 3 months     Finger fracture 9/6/2011     Finger fracture, right      Fistula 9/2/2011     Foot fracture      Freezing      Glaucoma     uses travatan and timolol     Glaucoma 9/6/2011     Hand pain 3/27/2012     Hypercholesteremia 9/6/2011     Hypercholesterolaemia     on  simvastatin     Macrocytosis 2013     Nocturia     1-3/noc     Over weight     with pd has gained 55 lbs     Parkinson's disease 1998    left side onset; left handed. dyskinesias     REM sleep behavior disorder     not frequent     Screening colonoscopy      Vitamin D deficiency      Wears glasses      Social History     Social History     Marital status:      Spouse name: N/A     Number of children: N/A     Years of education: N/A     Occupational History     Not on file.     Social History Main Topics     Smoking status: Never Smoker     Smokeless tobacco: Never Used     Alcohol use No     Drug use: Not on file     Sexual activity: Not Currently     Other Topics Concern     Not on file     Social History Narrative    Living in [place]    Not exercising    No tobacco    No alcohol        FAMILY HISTORY:  Her father  in his 70s from alcoholism.  Her mother is alive and age 94.  She has two sisters and one brother who are healthy.  She has two daughters who are 33 and 35.  She is of [place] background.        SOCIAL HISTORY:  She was born in [place] and grew up in the [place] then [place].  She came to [place] after college.  She has been  since , i.e., 39 years.  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       Drug and lactation database from the [place] National [employer] of Medicine:  http://toxnet.nlm.nih.gov/cgi-bin/sis/htmlgen?LACT      B/P: Data Unavailable, T: Data Unavailable, P: Data Unavailable, R: Data Unavailable 0 lbs 0 oz  not currently breastfeeding., There is no height or weight on file to calculate BMI.  Medications and Vitals not listed above were documented in the cart and reviewed by me.     Current Outpatient Prescriptions   Medication Sig Dispense Refill     Calcium Carb-Cholecalciferol (CALCIUM 500 +D) 500-400 MG-UNIT TABS Take by mouth daily 1  TAb @ 9-10am       carbidopa-levodopa  (SINEMET CR)  MG per CR tablet Take 1/2 tab at midnight 45 tablet 3     carbidopa-levodopa (SINEMET)  MG per tablet 1.5 TAB at 9AM, 1pm, and 5PM and  Take 1 to 1.5 tablets @ 9pm 540 tablet 3     cholecalciferol (VITAMIN D3) 1000 UNIT tablet 3 tabs @ 9-10am 30 tablet      donepezil (ARICEPT) 10 MG tablet 1 tab at 9-10pm 90 tablet 11     melatonin 3 MG tablet 1 tablet at 9-10pm 90 tablet 3     mercaptopurine (PURINETHOL) 50 MG tablet CHEMO 1 tab @ 9-10am 90 tablet 3     midodrine (PROAMATINE) 2.5 MG tablet 1 tab @ 9-10am, 1 tab @ 1-2pm and 1 tab @ 5-6pm === 3/day 450 tablet 3     QUEtiapine (SEROQUEL) 25 MG tablet 1/2 tab @ 1pm and 1 tablet at 12 midnight 135 tablet 3     selegiline (ELDEPRYL) 5 MG capsule 1 @9-10am and 1 @ 1-2pm  = 2/day 180 capsule 2     sennosides (SENNA) 8.6 MG tablet Take 1 tablet by mouth daily as needed for constipation. 100 tablet 3     simvastatin (ZOCOR) 20 MG tablet 1/2 tab @ 12midnight-1am 90 tablet 3     Urea (CARMOL 40) 40 % CREA Externally apply  topically. Apply on cracked skin as directed - 1 Tube 11         Ray Bravo MD

## 2018-06-08 ENCOUNTER — OFFICE VISIT (OUTPATIENT)
Dept: NEUROLOGY | Facility: CLINIC | Age: 73
End: 2018-06-08
Payer: COMMERCIAL

## 2018-06-08 ENCOUNTER — RECORDS - HEALTHEAST (OUTPATIENT)
Dept: ADMINISTRATIVE | Facility: OTHER | Age: 73
End: 2018-06-08

## 2018-06-08 VITALS — SYSTOLIC BLOOD PRESSURE: 108 MMHG | DIASTOLIC BLOOD PRESSURE: 74 MMHG | HEIGHT: 65 IN | HEART RATE: 96 BPM

## 2018-06-08 DIAGNOSIS — I95.1 ORTHOSTATIC HYPOTENSION: ICD-10-CM

## 2018-06-08 DIAGNOSIS — G20.A1 PARKINSON DISEASE (H): ICD-10-CM

## 2018-06-08 DIAGNOSIS — G20.A1 PARALYSIS AGITANS (H): ICD-10-CM

## 2018-06-08 DIAGNOSIS — G20.A1 PARKINSON'S DISEASE (H): Primary | ICD-10-CM

## 2018-06-08 RX ORDER — CARBIDOPA AND LEVODOPA 50; 200 MG/1; MG/1
TABLET, EXTENDED RELEASE ORAL
Qty: 45 TABLET | Refills: 3 | Status: SHIPPED | OUTPATIENT
Start: 2018-06-08 | End: 2019-05-26

## 2018-06-08 RX ORDER — QUETIAPINE FUMARATE 25 MG/1
TABLET, FILM COATED ORAL
Qty: 180 TABLET | Refills: 3 | Status: SHIPPED | OUTPATIENT
Start: 2018-06-08 | End: 2019-06-27

## 2018-06-08 RX ORDER — DONEPEZIL HYDROCHLORIDE 10 MG/1
TABLET, FILM COATED ORAL
Qty: 90 TABLET | Refills: 11 | Status: SHIPPED | OUTPATIENT
Start: 2018-06-08 | End: 2019-06-27

## 2018-06-08 RX ORDER — QUETIAPINE FUMARATE 25 MG/1
TABLET, FILM COATED ORAL
Qty: 135 TABLET | Refills: 3 | COMMUNITY
Start: 2018-06-08 | End: 2018-06-08

## 2018-06-08 RX ORDER — SELEGILINE HYDROCHLORIDE 5 MG/1
TABLET ORAL
Qty: 180 TABLET | Refills: 3 | Status: SHIPPED | OUTPATIENT
Start: 2018-06-08 | End: 2019-06-03

## 2018-06-08 RX ORDER — MIDODRINE HYDROCHLORIDE 2.5 MG/1
TABLET ORAL
Qty: 450 TABLET | Refills: 3 | Status: SHIPPED | OUTPATIENT
Start: 2018-06-08 | End: 2019-06-27

## 2018-06-08 RX ORDER — CARBIDOPA AND LEVODOPA 25; 100 MG/1; MG/1
TABLET ORAL
Qty: 540 TABLET | Refills: 3 | Status: SHIPPED | OUTPATIENT
Start: 2018-06-08 | End: 2019-05-26

## 2018-06-08 ASSESSMENT — PAIN SCALES - GENERAL: PAINLEVEL: NO PAIN (0)

## 2018-06-08 NOTE — Clinical Note
6/8/2018       RE: Jerica Blas  [address] [facility] [id number]     Dear Colleague,    Thank you for referring your patient, Jerica Blas, to the [facility] NEUROLOGY at [facility]. Please see a copy of my visit note below.    No notes on file    Again, thank you for allowing me to participate in the care of your patient.      Sincerely,    Ray Bravo MD

## 2018-06-08 NOTE — NURSING NOTE
Chief Complaint   Patient presents with     RECHECK     P RETURN - MOVEMENT DISORDER     Melissa Goddard MA

## 2018-06-08 NOTE — MR AVS SNAPSHOT
After Visit Summary   2018    Jerica Blas    MRN: [id number]           Patient Information     Date Of Birth          1945        Visit Information        Provider Department      2018 2:45 PM Ray Bravo MD [facility]        Today's Diagnoses     Parkinson's disease (H)    -  1    Paralysis agitans (H)        Parkinson disease (H)        Orthostatic hypotension          Care Instructions    PATIENT: Jerica Blas  72 year old female     : 1945    PANKAJ: 2018    Parkinsonism  orthosttic hypotension    Kept having low blood pressure with antidepressants  She is not being walked early in the morning  Walks 50 feet later in the day    Not taking anything for constipation  She is having a bowel movement a few times per week  Discussed this    She is wearing depends    She has hallucinations - seeing little girl and worried about her.     She has had issues between 4-6pm     Medications     9-10am 1-2pm 5-6pm 9-10pm 12-2am   Calcium carb-cholecalciferol calcium 500 + D 500+400 mg-unit 1       Carbidopa/levodopa Sinemet CR 50/200     1/2   Carbidopa/levodopa Sinemet  1.5 1.5 1.5 1    Cholecalciferol vitamin D3 1000 units 3       Donepezil aricept 10mg    1    Melatonin 3mg    1    Mercaptopurine purinethol 50mg tablet chemo 1       Midodrine proamatine 2.5mg 1 1 1     Quetiapine seroquel 25mg  1/2   1   Selegiline eldepryl 5mg 1 1      Sennosides senna 8.6mg Not taking       Simvastatin zocor 20mg     1/2   Urea carmol 40 40?% cream Not taking                                   History obtained from patient     Answers for HPI/ROS submitted by the patient on 2018   General Symptoms: No  Skin Symptoms: No  HENT Symptoms: No  EYE SYMPTOMS: No  HEART SYMPTOMS: No  LUNG SYMPTOMS: No  INTESTINAL SYMPTOMS: No  URINARY SYMPTOMS: No  GYNECOLOGIC SYMPTOMS: No  BREAST SYMPTOMS: No  SKELETAL SYMPTOMS: No  BLOOD SYMPTOMS: No  NERVOUS SYSTEM SYMPTOMS: No  MENTAL HEALTH  SYMPTOMS: Yes  Nervous or Anxious: No  Depression: Yes  Trouble sleeping: No  Trouble thinking or concentrating: No  Mood changes: Yes  Panic attacks: No        They have a CNA Xi for 4 hours on Wednesday and he goes fishing  Tonia is in [place]  Lily is in [place]  One has 2 to 4  And the other has 3 under age of 11 yrs.                  Documentation of a Face-to-Face Physician Encounter June 8, 2018    Jerica Blas  1945  [id number]    I certify that this patient is under my care and that I, or a nurse practitioner or physician's assistant working with me, had a face-to-face encounter that meets the physician face-to-face encounter requirements with this patient on: 6/8/2018.    The encounter with the patient was in whole, or in part, for the following medical condition, which is the primary reason for home health care:  Patient Active Problem List   Diagnosis     Parkinsonism (H)     Dementia     Crohn disease (H)     Fistula     Nocturia     REM sleep behavior disorder     Over weight     Vitamin D deficiency     Wears glasses     Glaucoma     Hypercholesteremia     Constipation     Finger fracture     Foot fracture     Confusion     Bunion     Cracked skin     Hand pain     Macrocytosis     Chronic renal disease     Chronic kidney disease, stage III (moderate)     Regional enteritis (H)     Lewy body dementia     Parkinsonian syndrome associated with symptomatic orthostatic hypotension (H)     Syncope       I certify that, based on my findings, the following services are medically necessary home health services: Nursing, Occupational Therapy, Physical Therapy, Speech Language Therapy and social work    My clinical findings support the need for the above services because: of parkinson    Further, I certify that my clinical findings support that this patient is homebound (i.e. absences from home require considerable and taxing effort and are for medical reasons or [employer] services or  infrequently or of short duration when for other reasons) because: parkinson    8th   Physician signature ____________Ray Bravo MD_______________________   June 8, 2018  Physician name: Ray Bravo MD    Fax ([phone]) or scan/email ([email or web address]) this completed document to [facility] within 24 hours of the referral date.  Questions: [phone].      Will increase the seroquel dose from 1/2 to 1 tab @ 1-2pm to see if can help her problems around 4-6pm    Medications     9-10am 1-2pm 5-6pm 9-10pm 12-2am   Calcium carb-cholecalciferol calcium 500 + D 500+400 mg-unit 1       Carbidopa/levodopa Sinemet CR 50/200     1/2   Carbidopa/levodopa Sinemet  1.5 1.5 1.5 1    Cholecalciferol vitamin D3 1000 units 3       Donepezil aricept 10mg    1    Melatonin 3mg    1    Mercaptopurine purinethol 50mg tablet chemo 1       Midodrine proamatine 2.5mg 1 1 1     Quetiapine seroquel 25mg  1   1   Selegiline eldepryl 5mg 1 1      Sennosides senna 8.6mg Not taking       Simvastatin zocor 20mg     1/2   Urea carmol 40 40?% cream Not taking                                   Return back in 6 months.           Follow-ups after your visit        Additional Services     HOME CARE NURSING REFERRAL       **Order classes of: [facility], [facility] and [facility] will route to the Home Care and Hospice Referral Pool.  Home Care or Hospice will then contact the patient to schedule their appointment.**    If you do not hear from Home Care and Hospice, or you would like to call to schedule, please call the referring place of service that your provider has listed below.  ______________________________________________________________________    Your provider has referred you to: [facility]: [facility] [phone]   [email or web address]    Extended Emergency Contact Information  Primary Emergency Contact: LELA TAM   [facility] Phone:  [phone]  Relation: Daughter  Secondary Emergency Contact: Reinaldo Dill   [facility]  Home Phone: [phone]  Relation: Spouse    Patient Anticipated Discharge Date: tbd   RN, PT, HHA to begin 24 - 48 hours after discharge.  PLEASE EVALUATE AND TREAT (Evaluation timeline is 24 - 48 hrs. Please call if there is need for a variance to this timeline).    REASON FOR REFERRAL: Assessment & Treatment: PT, RN, ST and social work and Home Based Palliative Care ([place] - terminal disease still being treated) Diagnosis: parkinsonism    ADDITIONAL SERVICES NEEDED: HHA, Life [facility], OT, Private Duty: RN and SW    OTHER PERTINENT INFORMATION: Patient was last seen by provider on 8th JUne 2018 for parkinson.    Current Outpatient Prescriptions:  Calcium Carb-Cholecalciferol (CALCIUM 500 +D) 500-400 MG-UNIT TABS, Take by mouth daily 1  TAb @ 9-10am, Disp: , Rfl:   carbidopa-levodopa (SINEMET CR)  MG per CR tablet, 1/2 tab between midnight and 2am, Disp: 45 tablet, Rfl: 3  carbidopa-levodopa (SINEMET)  MG per tablet, 1.5 TAB at 9AM, 1pm, and 5PM and  1 tablet @ 9pm, Disp: 540 tablet, Rfl: 3  cholecalciferol (VITAMIN D3) 1000 UNIT tablet, 3 tabs @ 9-10am, Disp: 30 tablet, Rfl:   donepezil (ARICEPT) 10 MG tablet, 1 tab at 9-10pm, Disp: 90 tablet, Rfl: 11  melatonin 3 MG tablet, 1 tablet at 9-10pm, Disp: 90 tablet, Rfl: 3  mercaptopurine (PURINETHOL) 50 MG tablet CHEMO, 1 tab @ 9-10am, Disp: 90 tablet, Rfl: 3  midodrine (PROAMATINE) 2.5 MG tablet, 1 tab @ 9-10am, 1 tab @ 1-2pm and 1 tab @ 5-6pm === 3/day, Disp: 450 tablet, Rfl: 3  QUEtiapine (SEROQUEL) 25 MG tablet, Increase to 1 tab by mouth @ 1-2pm and 1 tablet by mouth 12midnight-2am, Disp: 180 tablet, Rfl: 3  selegiline 5 MG TABS, 5mg tab by mouth twice daily @9-10am and 1-2pm, Disp: 180 tablet, Rfl: 3  sennosides (SENNA) 8.6 MG tablet, Take 1 tablet by mouth daily as needed for constipation., Disp: 100 tablet, Rfl: 3  simvastatin (ZOCOR) 20 MG tablet, 1/2 tab @  12midnight-1am, Disp: 90 tablet, Rfl: 3  [DISCONTINUED] carbidopa-levodopa (SINEMET CR)  MG per CR tablet, Take 1/2 tab at midnight, Disp: 45 tablet, Rfl: 3  [DISCONTINUED] carbidopa-levodopa (SINEMET)  MG per tablet, 1.5 TAB at 9AM, 1pm, and 5PM and  Take 1 to 1.5 tablets @ 9pm, Disp: 540 tablet, Rfl: 3  [DISCONTINUED] QUEtiapine (SEROQUEL) 25 MG tablet, 1/2 tab by mouth @ 1-2pm and 1 tablet by mouth 12midnight-2am, Disp: 135 tablet, Rfl: 3  [DISCONTINUED] QUEtiapine (SEROQUEL) 25 MG tablet, 1/2 tab @ 1pm and 1 tablet at 12 midnight, Disp: 135 tablet, Rfl: 3  [DISCONTINUED] selegiline (ELDEPRYL) 5 MG capsule, 1 @9-10am and 1 @ 1-2pm  = 2/day, Disp: 180 capsule, Rfl: 2      Patient Active Problem List:     Parkinsonism (H)     Dementia     Crohn disease (H)     Fistula     Nocturia     REM sleep behavior disorder     Over weight     Vitamin D deficiency     Wears glasses     Glaucoma     Hypercholesteremia     Constipation     Finger fracture     Foot fracture     Confusion     Bunion     Cracked skin     Hand pain     Macrocytosis     Chronic renal disease     Chronic kidney disease, stage III (moderate)     Regional enteritis (H)     Lewy body dementia     Parkinsonian syndrome associated with symptomatic orthostatic hypotension (H)     Syncope      Documentation of Face to Face and Certification for Home Health Services    I certify that patient, Jerica Blas is under my care and that I, or a Nurse Practitioner or Physician's Assistant working with me, had a face-to-face encounter that meets the physician face-to-face encounter requirements with this patient on: 6/8/2018.    This encounter with the patient was in whole, or in part, for the following medical condition, which is the primary reason for Home Health Care: parkinson.    I certify that, based on my findings, the following services are medically necessary Home Health Services: Nursing, Occupational Therapy and Physical Therapy    My  clinical findings support the need for the above services because: Nurse is needed: To assess cognition/mobility/hallucinations/etc. after changes in medications or other medical regimen., To provide assessment and oversight required in the home to assure adherence to the medical plan due to: parkinson/psychosis. and To provide caregiver training to assist with: adls and other.., Occupational Therapy Services are needed to assess and treat cognitive ability and address ADL safety due to impairment in parkinson mobility and cognition., Physical Therapy Services are needed to assess and treat the following functional impairments:  balance., Speech Therapy Services are needed to assess and treat impairments in language and/or swallow functions due to parkinson articulation disorder and dysphagia.    Further, I certify that my clinical findings support that this patient is homebound (i.e. absences from home require considerable and taxing effort and are for medical reasons or [employer] services or infrequently or of short duration when for other reasons) because: Requires assistance of another person or specialized equipment to access medical services because patient: Is unable to operate assistive equipment on their own..    Based on the above findings, I certify that this patient is confined to the home and needs intermittent skilled nursing care, physical therapy and/or speech therapy.  The patient is under my care, and I have initiated the establishment of the plan of care.  This patient will be followed by a physician who will periodically review the plan of care.    Physician/Provider to provide follow up care: Faizan Villagomez certified Physician at time of discharge: ana    Please be aware that coverage of these services is subject to the terms and limitations of your health insurance plan.  Call member services at your health plan with any benefit or coverage questions.                 "  Follow-up notes from your care team     Return in about 6 months (around 12/8/2018).      Your next 10 appointments already scheduled     Nov 30, 2018  2:45 PM CST   (Arrive by 2:30 PM)   Return Movement Disorder with Ray Bravo MD   [facility] ([facility])    [address] [id number] [phone]              Future tests that were ordered for you today     Open Future Orders        Priority Expected Expires Ordered    HOME CARE NURSING REFERRAL Routine  6/8/2019 6/8/2018            Who to contact     Please call your clinic at [phone] to:    Ask questions about your health    Make or cancel appointments    Discuss your medicines    Learn about your test results    Speak to your doctor            Additional Information About Your Visit        [employer] Information     [employer] gives you secure access to your electronic health record. If you see a primary care provider, you can also send messages to your care team and make appointments. If you have questions, please call your primary care clinic.  If you do not have a primary care provider, please call [phone] and they will assist you.      [employer] is an electronic gateway that provides easy, online access to your medical records. With [employer], you can request a clinic appointment, read your test results, renew a prescription or communicate with your care team.     To access your existing account, please contact your [facility] or call [phone] for assistance.        Care EveryWhere ID     This is your Care EveryWhere ID. This could be used by other organizations to access your [facility] medical records  [id number]        Your Vitals Were     Pulse Height                96 1.651 m (5' 5\")           Blood Pressure from Last 3 Encounters:   06/08/18 108/74   05/30/17 (!) 84/50   11/01/16 (!) 78/50    Weight from Last 3 Encounters:   11/30/17 68.5 " kg (151 lb)   05/30/17 68.5 kg (151 lb)   04/22/16 71 kg (156 lb 9.6 oz)                 Today's Medication Changes          These changes are accurate as of 6/8/18  2:48 PM.  If you have any questions, ask your nurse or doctor.               Start taking these medicines.        Dose/Directions    QUEtiapine 25 MG tablet   Commonly known as:  SEROQUEL   Used for:  Parkinson's disease (H)   Started by:  Ray Bravo MD        Increase to 1 tab by mouth @ 1-2pm and 1 tablet by mouth 12midnight-2am   Quantity:  180 tablet   Refills:  3       selegiline 5 MG Tabs   Used for:  Parkinson's disease (H)   Replaces:  selegiline 5 MG capsule   Started by:  Ray Bravo MD        5mg tab by mouth twice daily @9-10am and 1-2pm   Quantity:  180 tablet   Refills:  3         These medicines have changed or have updated prescriptions.        Dose/Directions    * carbidopa-levodopa  MG per tablet   Commonly known as:  SINEMET   This may have changed:  additional instructions   Used for:  Paralysis agitans (H)   Changed by:  Ray Bravo MD        1.5 TAB at 9AM, 1pm, and 5PM and  1 tablet @ 9pm   Quantity:  540 tablet   Refills:  3       * carbidopa-levodopa  MG per CR tablet   Commonly known as:  SINEMET CR   This may have changed:  additional instructions   Used for:  Parkinson disease (H)   Changed by:  Ray Bravo MD        1/2 tab between midnight and 2am   Quantity:  45 tablet   Refills:  3       * Notice:  This list has 2 medication(s) that are the same as other medications prescribed for you. Read the directions carefully, and ask your doctor or other care provider to review them with you.      Stop taking these medicines if you haven't already. Please contact your care team if you have questions.     selegiline 5 MG capsule   Commonly known as:  ELDEPRYL   Replaced by:  selegiline 5 MG Tabs   Stopped by:  Ray Bravo MD           Urea 40 % Crea   Commonly known as:  CARMOL 40    Stopped by:  Ray Bravo MD                Where to get your medicines      These medications were sent to [id number] - [place], [place] - [address]  [address] [place] [facility] [address]    Hours:  Ok's by kailee GUPTA 9/20/06 Phone:  [phone]     carbidopa-levodopa  MG per tablet    carbidopa-levodopa  MG per CR tablet    donepezil 10 MG tablet    midodrine 2.5 MG tablet    QUEtiapine 25 MG tablet    selegiline 5 MG Tabs                Primary Care Provider Office Phone # Fax #    Faizan Villagomez [phone]       [facility] [address]  [facility] [address]        Equal Access to Services     RACHAEL GLORIA AH: Hadii darci barrera hadasho Soomaali, waaxda luqadaha, qaybta kaalmada adeegyada, jazmyn ballardin haymitzi hensley . So [facility] [phone].    ATENCIÓN: Si habla español, tiene a mariee disposición servicios gratuitos de asistencia lingüística. [facility] [phone].    We comply with applicable federal civil rights laws and [place] laws. We do not discriminate on the basis of race, color, national origin, age, disability, sex, sexual orientation, or gender identity.            Thank you!     Thank you for choosing [facility]  for your care. Our goal is always to provide you with excellent care. Hearing back from our patients is one way we can continue to improve our services. Please take a few minutes to complete the written survey that you may receive in the mail after your visit with us. Thank you!             Your Updated Medication List - Protect others around you: Learn how to safely use, store and throw away your medicines at www.disposemymeds.org.          This list is accurate as of 6/8/18  2:48 PM.  Always use your most recent med list.                   Brand Name Dispense Instructions for use Diagnosis    calcium 500 +D 500-400 MG-UNIT Tabs   Generic drug:  Calcium Carb-Cholecalciferol      Take by  mouth daily 1  TAb @ 9-10am        * carbidopa-levodopa  MG per tablet    SINEMET    540 tablet    1.5 TAB at 9AM, 1pm, and 5PM and  1 tablet @ 9pm    Paralysis agitans (H)       * carbidopa-levodopa  MG per CR tablet    SINEMET CR    45 tablet    1/2 tab between midnight and 2am    Parkinson disease (H)       cholecalciferol 1000 UNIT tablet    vitamin D3    30 tablet    3 tabs @ 9-10am        donepezil 10 MG tablet    ARICEPT    90 tablet    1 tab at 9-10pm    Parkinson's disease (H)       melatonin 3 MG tablet     90 tablet    1 tablet at 9-10pm    Paralysis agitans (H)       mercaptopurine 50 MG tablet CHEMO    PURINETHOL    90 tablet    1 tab @ 9-10am        midodrine 2.5 MG tablet    PROAMATINE    450 tablet    1 tab @ 9-10am, 1 tab @ 1-2pm and 1 tab @ 5-6pm === 3/day    Orthostatic hypotension       QUEtiapine 25 MG tablet    SEROQUEL    180 tablet    Increase to 1 tab by mouth @ 1-2pm and 1 tablet by mouth 12midnight-2am    Parkinson's disease (H)       selegiline 5 MG Tabs     180 tablet    5mg tab by mouth twice daily @9-10am and 1-2pm    Parkinson's disease (H)       sennosides 8.6 MG tablet    SENOKOT    100 tablet    Take 1 tablet by mouth daily as needed for constipation.    Constipation       ZOCOR 20 MG tablet   Generic drug:  simvastatin     90 tablet    1/2 tab @ 12midnight-1am        * Notice:  This list has 2 medication(s) that are the same as other medications prescribed for you. Read the directions carefully, and ask your doctor or other care provider to review them with you.

## 2018-06-08 NOTE — PATIENT INSTRUCTIONS
PATIENT: Jerica Blas  72 year old female     : 1945    PANKAJ: 2018    Parkinsonism  orthosttic hypotension    Kept having low blood pressure with antidepressants  She is not being walked early in the morning  Walks 50 feet later in the day    Not taking anything for constipation  She is having a bowel movement a few times per week  Discussed this    She is wearing depends    She has hallucinations - seeing little girl and worried about her.     She has had issues between 4-6pm     Medications     9-10am 1-2pm 5-6pm 9-10pm 12-2am   Calcium carb-cholecalciferol calcium 500 + D 500+400 mg-unit 1       Carbidopa/levodopa Sinemet CR 50/200     1/2   Carbidopa/levodopa Sinemet  1.5 1.5 1.5 1    Cholecalciferol vitamin D3 1000 units 3       Donepezil aricept 10mg    1    Melatonin 3mg    1    Mercaptopurine purinethol 50mg tablet chemo 1       Midodrine proamatine 2.5mg 1 1 1     Quetiapine seroquel 25mg  1/2   1   Selegiline eldepryl 5mg 1 1      Sennosides senna 8.6mg Not taking       Simvastatin zocor 20mg     1/2   Urea carmol 40 40?% cream Not taking                                   History obtained from patient     Answers for HPI/ROS submitted by the patient on 2018   General Symptoms: No  Skin Symptoms: No  HENT Symptoms: No  EYE SYMPTOMS: No  HEART SYMPTOMS: No  LUNG SYMPTOMS: No  INTESTINAL SYMPTOMS: No  URINARY SYMPTOMS: No  GYNECOLOGIC SYMPTOMS: No  BREAST SYMPTOMS: No  SKELETAL SYMPTOMS: No  BLOOD SYMPTOMS: No  NERVOUS SYSTEM SYMPTOMS: No  MENTAL HEALTH SYMPTOMS: Yes  Nervous or Anxious: No  Depression: Yes  Trouble sleeping: No  Trouble thinking or concentrating: No  Mood changes: Yes  Panic attacks: No        They have a CNA Xi for 4 hours on Wednesday and he goes fishing  Tonia is in [place]  Lily is in [place]  One has 2 to 4  And the other has 3 under age of 11 yrs.                  Documentation of a Face-to-Face Physician Encounter 2018    Jerica  Wan  1945  [id number]    I certify that this patient is under my care and that I, or a nurse practitioner or physician's assistant working with me, had a face-to-face encounter that meets the physician face-to-face encounter requirements with this patient on: 6/8/2018.    The encounter with the patient was in whole, or in part, for the following medical condition, which is the primary reason for home health care:  Patient Active Problem List   Diagnosis     Parkinsonism (H)     Dementia     Crohn disease (H)     Fistula     Nocturia     REM sleep behavior disorder     Over weight     Vitamin D deficiency     Wears glasses     Glaucoma     Hypercholesteremia     Constipation     Finger fracture     Foot fracture     Confusion     Bunion     Cracked skin     Hand pain     Macrocytosis     Chronic renal disease     Chronic kidney disease, stage III (moderate)     Regional enteritis (H)     Lewy body dementia     Parkinsonian syndrome associated with symptomatic orthostatic hypotension (H)     Syncope       I certify that, based on my findings, the following services are medically necessary home health services: Nursing, Occupational Therapy, Physical Therapy, Speech Language Therapy and social work    My clinical findings support the need for the above services because: of parkinson    Further, I certify that my clinical findings support that this patient is homebound (i.e. absences from home require considerable and taxing effort and are for medical reasons or [employer] services or infrequently or of short duration when for other reasons) because: parkinson    8th   Physician signature ____________Ray Bravo MD_______________________   June 8, 2018  Physician name: Ray Bravo MD    Fax ([phone]) or scan/email ([email or web address]) this completed document to [facility] within 24 hours of the referral date.  Questions: [phone].      Will increase the seroquel dose from 1/2 to 1 tab @  1-2pm to see if can help her problems around 4-6pm    Medications     9-10am 1-2pm 5-6pm 9-10pm 12-2am   Calcium carb-cholecalciferol calcium 500 + D 500+400 mg-unit 1       Carbidopa/levodopa Sinemet CR 50/200     1/2   Carbidopa/levodopa Sinemet  1.5 1.5 1.5 1    Cholecalciferol vitamin D3 1000 units 3       Donepezil aricept 10mg    1    Melatonin 3mg    1    Mercaptopurine purinethol 50mg tablet chemo 1       Midodrine proamatine 2.5mg 1 1 1     Quetiapine seroquel 25mg  1   1   Selegiline eldepryl 5mg 1 1      Sennosides senna 8.6mg Not taking       Simvastatin zocor 20mg     1/2   Urea carmol 40 40?% cream Not taking                                   Return back in 6 months.

## 2018-06-08 NOTE — LETTER
2018      RE: Jerica Blas  [address] [place] [address]       Diagnosis/Summary/Recommendations:    PATIENT: Jerica Blas  72 year old female     : 1945    PANKAJ: 2018    Parkinsonism  orthosttic hypotension    Kept having low blood pressure with antidepressants  She is not being walked early in the morning  Walks 50 feet later in the day    Not taking anything for constipation  She is having a bowel movement a few times per week  Discussed this    She is wearing depends    She has hallucinations - seeing little girl and worried about her.     She has had issues between 4-6pm     Medications     9-10am 1-2pm 5-6pm 9-10pm 12-2am   Calcium carb-cholecalciferol calcium 500 + D 500+400 mg-unit 1       Carbidopa/levodopa Sinemet CR 50/200     1/2   Carbidopa/levodopa Sinemet  1.5 1.5 1.5 1    Cholecalciferol vitamin D3 1000 units 3       Donepezil aricept 10mg    1    Melatonin 3mg    1    Mercaptopurine purinethol 50mg tablet chemo 1       Midodrine proamatine 2.5mg 1 1 1     Quetiapine seroquel 25mg  1/2   1   Selegiline eldepryl 5mg 1 1      Sennosides senna 8.6mg Not taking       Simvastatin zocor 20mg     1/2   Urea carmol 40 40?% cream Not taking                                   History obtained from patient     Answers for HPI/ROS submitted by the patient on 2018   General Symptoms: No  Skin Symptoms: No  HENT Symptoms: No  EYE SYMPTOMS: No  HEART SYMPTOMS: No  LUNG SYMPTOMS: No  INTESTINAL SYMPTOMS: No  URINARY SYMPTOMS: No  GYNECOLOGIC SYMPTOMS: No  BREAST SYMPTOMS: No  SKELETAL SYMPTOMS: No  BLOOD SYMPTOMS: No  NERVOUS SYSTEM SYMPTOMS: No  MENTAL HEALTH SYMPTOMS: Yes  Nervous or Anxious: No  Depression: Yes  Trouble sleeping: No  Trouble thinking or concentrating: No  Mood changes: Yes  Panic attacks: No        They have a CNA Xi for 4 hours on Wednesday and he goes fishing  Tonia is in [place]  Lily is in [place]  One has 2 to 4  And the other has 3  under age of 11 yrs.                  Documentation of a Face-to-Face Physician Encounter June 8, 2018    Jerica Blas  1945  [id number]    I certify that this patient is under my care and that I, or a nurse practitioner or physician's assistant working with me, had a face-to-face encounter that meets the physician face-to-face encounter requirements with this patient on: 6/8/2018.    The encounter with the patient was in whole, or in part, for the following medical condition, which is the primary reason for home health care:  Patient Active Problem List   Diagnosis     Parkinsonism (H)     Dementia     Crohn disease (H)     Fistula     Nocturia     REM sleep behavior disorder     Over weight     Vitamin D deficiency     Wears glasses     Glaucoma     Hypercholesteremia     Constipation     Finger fracture     Foot fracture     Confusion     Bunion     Cracked skin     Hand pain     Macrocytosis     Chronic renal disease     Chronic kidney disease, stage III (moderate)     Regional enteritis (H)     Lewy body dementia     Parkinsonian syndrome associated with symptomatic orthostatic hypotension (H)     Syncope       I certify that, based on my findings, the following services are medically necessary home health services: Nursing, Occupational Therapy, Physical Therapy, Speech Language Therapy and social work    My clinical findings support the need for the above services because: of parkinson    Further, I certify that my clinical findings support that this patient is homebound (i.e. absences from home require considerable and taxing effort and are for medical reasons or [facility] services or infrequently or of short duration when for other reasons) because: parkinson    8th   Physician signature ____________Ray Bravo MD_______________________   June 8, 2018  Physician name: Ray Bravo MD    Fax ([phone]) or scan/email ([email or web address]) this completed document to [facility] within  24 hours of the referral date.  Questions: [phone].      Will increase the seroquel dose from 1/2 to 1 tab @ 1-2pm to see if can help her problems around 4-6pm    Medications     9-10am 1-2pm 5-6pm 9-10pm 12-2am   Calcium carb-cholecalciferol calcium 500 + D 500+400 mg-unit 1       Carbidopa/levodopa Sinemet CR 50/200     1/2   Carbidopa/levodopa Sinemet  1.5 1.5 1.5 1    Cholecalciferol vitamin D3 1000 units 3       Donepezil aricept 10mg    1    Melatonin 3mg    1    Mercaptopurine purinethol 50mg tablet chemo 1       Midodrine proamatine 2.5mg 1 1 1     Quetiapine seroquel 25mg  1   1   Selegiline eldepryl 5mg 1 1      Sennosides senna 8.6mg Not taking       Simvastatin zocor 20mg     1/2   Urea carmol 40 40?% cream Not taking                                   Return back in 6 months.     Coding statement:   Duration of  Services: patient care and care coordination was 25 minutes  Greater than 50% of this visit was spent in counseling and coordination of care.     Ray Bravo MD     ______________________________________    Last visit date and details:     PATIENT: Jerica Blas  72 year old female      : 1945     PANKAJ: 2017     Parkinson     Calcium carbonate cholecalciferol @ 9-10am  Sinemet 50/200 1/2 tab at midnight - 1am  Sinemet 25/100  1.5 TABS @9-10AM, 1.5 TABS @1-2PM AND 1.5 TABS @5-6PM AND 1 TAB @ 9-10PM   Selegiline 5MG - 1 TABS @ 9-10AM and 1 @ 1-2pm  mirapex 1mg WEANED OFF THIS.      Cholecalciferol 1000 3 tabs per day @ 9-10am     Cognitive impairment  Donepezil aricept 10mg  AT 9-10PM      Orthostatic hypotension  Proamatine/midodrine 2.5mg  1tab @ 9-10AM, 1-2pm 5-6PM       Hallucinations  Quetiapine seroquel 25mg 1 /2 TAB @ 1-2 PM AND 1 TAB @ MIDNIGHT-1am.       Cholesterol  Simvastatin 20mg 1/2 TAB @ midnight -1am      MERCAPTOPURINE 50MG @ 9-10am     Melatonin 3mg at 9-10pm     Sennosides senna - not taking      Urea carmol 40% for cracked skin - not taking     Blood  pressure has been okay  116/60 today  Weight recorded.   Other visits are elsewhere.     She tried depression pills which aggravated her blood pressure in that they dropped her pressure and she had orthostatic symptoms with faints.   She has sun downing about 4 pm presently - gets some agitation.      She continues to have some rem sleep issues. She is on 3mg of melatonin and she may need more melatonin if this is an issues  She has had falls.         Meds                   9-10am 1-2pm 5-6pm 9-10p Midnight-1am                                  Sinemet 25/100                  1.5 1.5 1.5 1     Sinemet CR 50/200                                 1/2   Selegiline eldepryl 5mg                           1 1         mirapex Not taking           quetiapine seroquel 25mg   1/2     1   Simvastatin 20mg         1/2   Mercaptopurine 50mg 1           Melatonin 3mg       1     sennosides Not taking           Urea carmol lotion not taking           Midodrine proamatine 2.5mg 1 1 1       Calcium carbonate 1           Cholecalciferol 1000 3           Donepezil aricept 10mg         1                                  Over 50% of this visit was spent in patient care and care coordination.      History obtained from patient     Total visit time was 25 minutes     For now we will not change her medication regimen.     Return back in 6 months.      Ray Bravo MD              ______________________________________      Patient was asked about 14 Review of systems including changes in vision (dry eyes, double vision), hearing, heart, lungs, musculoskeletal, depression, anxiety, snoring, RBD, insomnia, urinary frequency, urinary urgency, constipation, swallowing problems, hematological, ID, allergies, skin problems: seborrhea, endocrinological: thyroid, diabetes, cholesterol; balance, weight changes, and other neurological problems and these were not significant at this time except for   Patient Active Problem List   Diagnosis      Parkinsonism (H)     Dementia     Crohn disease (H)     Fistula     Nocturia     REM sleep behavior disorder     Over weight     Vitamin D deficiency     Wears glasses     Glaucoma     Hypercholesteremia     Constipation     Finger fracture     Foot fracture     Confusion     Bunion     Cracked skin     Hand pain     Macrocytosis     Chronic renal disease     Chronic kidney disease, stage III (moderate)     Regional enteritis (H)     Lewy body dementia     Parkinsonian syndrome associated with symptomatic orthostatic hypotension (H)     Syncope          Allergies   Allergen Reactions     Citalopram Other (See Comments)     Fainting     Sertraline Other (See Comments)     Fainting     Meperidine      Other reaction(s): Other (See Comments)  Confusion.     No Clinical Screening - See Comments Rash     Itchy eyes.     Demerol      Hay Fever & [A.R.M.]      Mirapex [Pramipexole] Other (See Comments)     Hallucinations     Namenda [Memantine Hydrochloride]      No benefit       Requip [Ropinirole Hydrochloride]      ? confusion     Memantine Rash     Neomycin Rash     Past Surgical History:   Procedure Laterality Date     BIOPSY OF SKIN LESION  october 2014    seborrheic keratosis of left face     EYE SURGERY  jan 2012    left cataract     EYE SURGERY  march 2012    right cataract     fistula repair?       Past Medical History:   Diagnosis Date     Bunion 3/27/2012     Chronic renal disease 9/24/2013     Confusion 9/6/2011     Constipation     1/week     Constipation 9/6/2011     Cracked skin 3/27/2012     Crohn disease (H) 9/2/2011     Crohn's     mercaptopurine     Disorientation     denies hallucinations. Has memory problems     Entero-colonic fistula 2000    related to crohns; [facility]; had a prior one      Falls     once every 3 months     Finger fracture 9/6/2011     Finger fracture, right      Fistula 9/2/2011     Foot fracture      Freezing      Glaucoma     uses travatan and timolol     Glaucoma 9/6/2011      Hand pain 3/27/2012     Hypercholesteremia 2011     Hypercholesterolaemia     on simvastatin     Macrocytosis 2013     Nocturia     1-3/noc     Over weight     with pd has gained 55 lbs     Parkinson's disease 1998    left side onset; left handed. dyskinesias     REM sleep behavior disorder     not frequent     Screening colonoscopy      Vitamin D deficiency      Wears glasses      Social History     Social History     Marital status:      Spouse name: N/A     Number of children: N/A     Years of education: N/A     Occupational History     Not on file.     Social History Main Topics     Smoking status: Never Smoker     Smokeless tobacco: Never Used     Alcohol use No     Drug use: Not on file     Sexual activity: Not Currently     Other Topics Concern     Not on file     Social History Narrative    Living in [place]    Not exercising    No tobacco    No alcohol        FAMILY HISTORY:  Her father  in his 70s from alcoholism.  Her mother is alive and age 94.  She has two sisters and one brother who are healthy.  She has two daughters who are 33 and 35.  She is of [place] background.        SOCIAL HISTORY:  She was born in [place] and grew up in the [place] and then [place].  She came to [place] after college.  She has been  since , i.e., 39 years.  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       Drug and lactation database from the [place] National [employer] of Medicine:  http://toxnet.nlm.nih.gov/cgi-bin/sis/htmlgen?LACT      B/P: Data Unavailable, T: Data Unavailable, P: Data Unavailable, R: Data Unavailable 0 lbs 0 oz  not currently breastfeeding., There is no height or weight on file to calculate BMI.  Medications and Vitals not listed above were documented in the cart and reviewed by me.     Current Outpatient Prescriptions   Medication Sig Dispense Refill     Calcium Carb-Cholecalciferol (CALCIUM 500 +D)  500-400 MG-UNIT TABS Take by mouth daily 1  TAb @ 9-10am       carbidopa-levodopa (SINEMET CR)  MG per CR tablet Take 1/2 tab at midnight 45 tablet 3     carbidopa-levodopa (SINEMET)  MG per tablet 1.5 TAB at 9AM, 1pm, and 5PM and  Take 1 to 1.5 tablets @ 9pm 540 tablet 3     cholecalciferol (VITAMIN D3) 1000 UNIT tablet 3 tabs @ 9-10am 30 tablet      donepezil (ARICEPT) 10 MG tablet 1 tab at 9-10pm 90 tablet 11     melatonin 3 MG tablet 1 tablet at 9-10pm 90 tablet 3     mercaptopurine (PURINETHOL) 50 MG tablet CHEMO 1 tab @ 9-10am 90 tablet 3     midodrine (PROAMATINE) 2.5 MG tablet 1 tab @ 9-10am, 1 tab @ 1-2pm and 1 tab @ 5-6pm === 3/day 450 tablet 3     QUEtiapine (SEROQUEL) 25 MG tablet 1/2 tab @ 1pm and 1 tablet at 12 midnight 135 tablet 3     selegiline (ELDEPRYL) 5 MG capsule 1 @9-10am and 1 @ 1-2pm  = 2/day 180 capsule 2     sennosides (SENNA) 8.6 MG tablet Take 1 tablet by mouth daily as needed for constipation. 100 tablet 3     simvastatin (ZOCOR) 20 MG tablet 1/2 tab @ 12midnight-1am 90 tablet 3     Urea (CARMOL 40) 40 % CREA Externally apply  topically. Apply on cracked skin as directed - 1 Tube 11         Ray Bravo MD

## 2018-06-11 ENCOUNTER — COMMUNICATION - HEALTHEAST (OUTPATIENT)
Dept: INTERNAL MEDICINE | Facility: CLINIC | Age: 73
End: 2018-06-11

## 2018-06-14 NOTE — TELEPHONE ENCOUNTER
[facility] Call Center    Phone Message    May a detailed message be left on voicemail: yes    Reason for Call: Other: Rx request was for capsules which are substantially higher in price so they would like to change back to the tablets as in the past.  Please call with verbal OK     Action Taken: Message routed to:  [facility] Neurology

## 2018-06-15 ENCOUNTER — TELEPHONE (OUTPATIENT)
Dept: NEUROLOGY | Facility: CLINIC | Age: 73
End: 2018-06-15

## 2018-06-15 NOTE — TELEPHONE ENCOUNTER
LEFT VOICE MAIL on [facility] phone authorizing the prescription for tabs (as it is already written in the chart).    selegiline 5 MG TABS 180 tablet 3 2018  --   Simg tab by mouth twice daily @9-10am and 1-2pm   Class: E-Prescribe   Order: [id number]   E-Prescribing Status: Receipt confirmed by pharmacy (2018  2:26 PM CDT)

## 2018-06-15 NOTE — TELEPHONE ENCOUNTER
[facility] Call Center    Phone Message    May a detailed message be left on voicemail: yes    Reason for Call: Other: Pharmacy calling needing clarification if Dr. Bravo wants tabs or capsules. Pharmacist states the pt was taking capsules before.     Action Taken: Message routed to:  [facility]

## 2018-06-15 NOTE — TELEPHONE ENCOUNTER
Called [facility] and told them we switched to tabs because insurance said they are cheaper than capsules. Patient can fill for either tabs or capsules.

## 2018-06-18 NOTE — TELEPHONE ENCOUNTER
Called patient's pharmacy to confirm the medication didn't need a prior authorization.  Pharmacy tech confirmed the prescription went through Medicare and looked as though it was covered.

## 2018-07-09 ENCOUNTER — COMMUNICATION - HEALTHEAST (OUTPATIENT)
Dept: INTERNAL MEDICINE | Facility: CLINIC | Age: 73
End: 2018-07-09

## 2018-07-17 ENCOUNTER — COMMUNICATION - HEALTHEAST (OUTPATIENT)
Dept: INTERNAL MEDICINE | Facility: CLINIC | Age: 73
End: 2018-07-17

## 2018-07-25 ENCOUNTER — COMMUNICATION - HEALTHEAST (OUTPATIENT)
Dept: INTERNAL MEDICINE | Facility: CLINIC | Age: 73
End: 2018-07-25

## 2018-08-03 ENCOUNTER — RECORDS - HEALTHEAST (OUTPATIENT)
Dept: ADMINISTRATIVE | Facility: OTHER | Age: 73
End: 2018-08-03

## 2018-08-21 ENCOUNTER — OFFICE VISIT - HEALTHEAST (OUTPATIENT)
Dept: INTERNAL MEDICINE | Facility: CLINIC | Age: 73
End: 2018-08-21

## 2018-08-21 DIAGNOSIS — F02.818 LEWY BODY DEMENTIA WITH BEHAVIORAL DISTURBANCE (H): ICD-10-CM

## 2018-08-21 DIAGNOSIS — G31.83 LEWY BODY DEMENTIA WITH BEHAVIORAL DISTURBANCE (H): ICD-10-CM

## 2018-08-21 ASSESSMENT — MIFFLIN-ST. JEOR: SCORE: 1165.4

## 2018-10-17 ENCOUNTER — COMMUNICATION - HEALTHEAST (OUTPATIENT)
Dept: SCHEDULING | Facility: CLINIC | Age: 73
End: 2018-10-17

## 2018-10-19 ENCOUNTER — AMBULATORY - HEALTHEAST (OUTPATIENT)
Dept: INTERNAL MEDICINE | Facility: CLINIC | Age: 73
End: 2018-10-19

## 2018-10-26 ENCOUNTER — RECORDS - HEALTHEAST (OUTPATIENT)
Dept: ADMINISTRATIVE | Facility: OTHER | Age: 73
End: 2018-10-26

## 2018-12-21 ENCOUNTER — RECORDS - HEALTHEAST (OUTPATIENT)
Dept: ADMINISTRATIVE | Facility: OTHER | Age: 73
End: 2018-12-21

## 2018-12-21 ENCOUNTER — COMMUNICATION - HEALTHEAST (OUTPATIENT)
Dept: SCHEDULING | Facility: CLINIC | Age: 73
End: 2018-12-21

## 2018-12-23 ENCOUNTER — COMMUNICATION - HEALTHEAST (OUTPATIENT)
Dept: INTERNAL MEDICINE | Facility: CLINIC | Age: 73
End: 2018-12-23

## 2018-12-23 DIAGNOSIS — E78.5 HYPERLIPIDEMIA: ICD-10-CM

## 2019-02-02 ENCOUNTER — COMMUNICATION - HEALTHEAST (OUTPATIENT)
Dept: INTERNAL MEDICINE | Facility: CLINIC | Age: 74
End: 2019-02-02

## 2019-02-02 DIAGNOSIS — K50.118 CROHN'S COLITIS, OTHER COMPLICATION (H): ICD-10-CM

## 2019-03-13 ENCOUNTER — TELEPHONE (OUTPATIENT)
Dept: NEUROLOGY | Facility: CLINIC | Age: 74
End: 2019-03-13

## 2019-05-03 ENCOUNTER — COMMUNICATION - HEALTHEAST (OUTPATIENT)
Dept: INTERNAL MEDICINE | Facility: CLINIC | Age: 74
End: 2019-05-03

## 2019-05-03 DIAGNOSIS — K50.118 CROHN'S COLITIS, OTHER COMPLICATION (H): ICD-10-CM

## 2019-05-26 DIAGNOSIS — G20.A1 PARALYSIS AGITANS (H): ICD-10-CM

## 2019-05-26 DIAGNOSIS — G20.A1 PARKINSON DISEASE (H): ICD-10-CM

## 2019-05-29 RX ORDER — CARBIDOPA AND LEVODOPA 25; 100 MG/1; MG/1
TABLET ORAL
Qty: 165 TABLET | Refills: 0 | Status: SHIPPED | OUTPATIENT
Start: 2019-05-29 | End: 2019-06-24

## 2019-05-29 RX ORDER — CARBIDOPA AND LEVODOPA 50; 200 MG/1; MG/1
TABLET, EXTENDED RELEASE ORAL
Qty: 15 TABLET | Refills: 0 | Status: SHIPPED | OUTPATIENT
Start: 2019-05-29 | End: 2019-06-24

## 2019-05-29 NOTE — TELEPHONE ENCOUNTER
Nurse received refill request for:   carbidopa-levodopa (SINEMET CR)  MG per CR tablet 45 tablet 3 2018  No   Si/2 tab between midnight and 2am     carbidopa-levodopa (SINEMET)  MG per tablet 540 tablet 3 2018  No   Si.5 TAB at 9AM, 1pm, and 5PM and  1 tablet @ 9pm     Last re-ordered: 2019    Last appointment: 2018    Next appointment: Not scheduled    Action taken: Will send a 1 month supply with a note stating she needs to make an appointment to be seen in clinic.

## 2019-06-03 DIAGNOSIS — G20.A1 PARKINSON'S DISEASE (H): ICD-10-CM

## 2019-06-03 RX ORDER — SELEGILINE HYDROCHLORIDE 5 MG/1
TABLET ORAL
Qty: 60 TABLET | Refills: 2 | Status: SHIPPED | OUTPATIENT
Start: 2019-06-03 | End: 2019-06-27

## 2019-06-03 NOTE — TELEPHONE ENCOUNTER
Nurse received refill request for:   selegiline 5 MG TABS 180 tablet 3 2018  --   Simg tab by mouth twice daily @9-10am and 1-2pm     Pharmacy: [facility]    Last re-ordered: 2018    Last appointment: 2018    Next appointment: Not scheduled     Action taken: Will send a 1 month supply to be signed by the provider with a note stating she needs to be seen in clinic.

## 2019-06-20 ENCOUNTER — MYC MEDICAL ADVICE (OUTPATIENT)
Dept: NEUROLOGY | Facility: CLINIC | Age: 74
End: 2019-06-20

## 2019-06-24 RX ORDER — CARBIDOPA AND LEVODOPA 50; 200 MG/1; MG/1
TABLET, EXTENDED RELEASE ORAL
Qty: 45 TABLET | Refills: 3 | COMMUNITY
Start: 2019-06-24 | End: 2019-06-27

## 2019-06-24 RX ORDER — CARBIDOPA AND LEVODOPA 25; 100 MG/1; MG/1
TABLET ORAL
Qty: 495 TABLET | Refills: 3 | COMMUNITY
Start: 2019-06-24 | End: 2019-06-27

## 2019-06-24 RX ORDER — TOBRAMYCIN AND DEXAMETHASONE 3; 1 MG/ML; MG/ML
SUSPENSION/ DROPS OPHTHALMIC
Refills: 1 | COMMUNITY
Start: 2019-03-01 | End: 2019-06-27

## 2019-06-24 NOTE — PROGRESS NOTES
Diagnosis/Summary/Recommendations:    PATIENT: Jerica Blas  73 year old female     : 1945    PANKAJ: 2019    Parkinson    Her day has shifted. Getting up at noon and goes to bed at 10pm  She is eating two meals per day.                 Documentation of a Face-to-Face Physician Encounter 2019    Jerica Blas  1945  [id number]    I certify that this patient is under my care and that I, or a nurse practitioner or physician's assistant working with me, had a face-to-face encounter that meets the physician face-to-face encounter requirements with this patient on: 2019.    The encounter with the patient was in whole, or in part, for the following medical condition, which is the primary reason for home health care:  Encounter Diagnoses   Name Primary?     Parkinson disease (H) Yes     Paralysis agitans (H)        I certify that, based on my findings, the following services are medically necessary home health services: Nursing, Occupational Therapy, Physical Therapy, Speech Language Therapy and social work    My clinical findings support the need for the above services because: parkinson/dementia    Further, I certify that my clinical findings support that this patient is homebound (i.e. absences from home require considerable and taxing effort and are for medical reasons or Judaism services or infrequently or of short duration when for other reasons) because: parkinson/dementia      Physician signature ___________________________________   2019  Physician name: Ray Bravo MD    Fax ([phone]) or scan/email ([email or web address]) this completed document to [facility] within 24 hours of the referral date.  Questions: [phone].           Medications     9-10am 1-2pm 5-6pm 9-10pm 12-2am   Calcium carb-cholecalciferol calcium 500 + D 500+400 mg-unit 1           Carbidopa/levodopa Sinemet CR 50/200         1/2   Carbidopa/levodopa Sinemet  1.5 1.5 1.5 1      Cholecalciferol vitamin D3 1000 units 3           Donepezil aricept 10mg       1     Melatonin 3mg       1     Mercaptopurine purinethol 50mg tablet chemo 1           Midodrine proamatine 2.5mg 1 1 1       Quetiapine seroquel 25mg   1     1   Selegiline eldepryl 5mg 1 1         Sennosides senna 8.6mg Not taking           Simvastatin zocor 20mg         1/2   Urea carmol 40 40?% cream Not taking                                                          History obtained from patient    Blood pressure was 103/73 today  Discussed low blood pressure and midodrine.      Medications     1030am 230/3pm 630/7pm 10pm 1230am   Calcium carb-cholecalciferol calcium 500 + D 500+400 mg-unit 2x1/2           Carbidopa/levodopa Sinemet CR 50/200         1/2   Carbidopa/levodopa Sinemet  1.5 1.5 1.5 1     Donepezil aricept 10mg       1     Melatonin 3mg       1     Mercaptopurine purinethol 50mg tablet chemo 1           Midodrine proamatine 2.5mg 1 1 1       Quetiapine seroquel 25mg   1     1   Selegiline eldepryl 5mg 1 1         Simvastatin zocor 20mg         1/2   Tobramycin-dexamethasone tobradex 0.3-0.1% ophthalmic suspesion As needed       Vitamin D3 1000 units 1           Vitamin D3 2000 units 1                           PLAN  1. Simplify regimen - by reducing the vitamin D3 to only 2000 units per day. Stop the 1000 unit dose.     2.  wanted me to refill medications for next year so I did this for all her medications except for her statin    3. Will order routine blood work so is available for Dr. Villagomez    4. Updated her medication list including the eliminated 1000 units of vitamin d3    5. If she is having more fainting spells may need to use an additional 2.5mg dose of midodrine/proamatine. Not clear if there is a specific time when these are happening.         Medications     1030am 230/3pm 630/7pm 10pm 1230am   Calcium carb-cholecalciferol calcium 500 + D 500+400 mg-unit 2 x 1/2 tabs           Carbidopa/levodopa  Sinemet CR 50/200         1/2   Carbidopa/levodopa Sinemet  1.5 1.5 1.5 1     Donepezil aricept 10mg       1     Melatonin 3mg       1     Mercaptopurine purinethol 50mg tablet chemo 1           Midodrine proamatine 2.5mg 1 1 1       Quetiapine seroquel 25mg   1     1   Selegiline eldepryl 5mg 1 1         Simvastatin zocor 20mg         1/2   Tobramycin-dexamethasone tobradex 0.3-0.1% ophthalmic suspesion As needed       Vitamin D3 2000 units 1                             Coding statement:   Duration of  Services: patient care and care coordination was 25 minutes  Greater than 50% of this visit was spent in counseling and coordination of care.     Ray Bravo MD     ______________________________________    Last visit date and details:     Answers for HPI/ROS submitted by the patient on 6/25/2019   General Symptoms: No  Skin Symptoms: No  HENT Symptoms: No  EYE SYMPTOMS: No  HEART SYMPTOMS: No  LUNG SYMPTOMS: No  INTESTINAL SYMPTOMS: No  URINARY SYMPTOMS: No  GYNECOLOGIC SYMPTOMS: No  BREAST SYMPTOMS: No  SKELETAL SYMPTOMS: No  BLOOD SYMPTOMS: No  NERVOUS SYSTEM SYMPTOMS: No  MENTAL HEALTH SYMPTOMS: No    PANKAJ: June 8, 2018     Parkinsonism  orthosttic hypotension     Kept having low blood pressure with antidepressants  She is not being walked early in the morning  Walks 50 feet later in the day     Not taking anything for constipation  She is having a bowel movement a few times per week  Discussed this     She is wearing depends     She has hallucinations - seeing little girl and worried about her.      She has had issues between 4-6pm      Medications     9-10am 1-2pm 5-6pm 9-10pm 12-2am   Calcium carb-cholecalciferol calcium 500 + D 500+400 mg-unit 1           Carbidopa/levodopa Sinemet CR 50/200         1/2   Carbidopa/levodopa Sinemet  1.5 1.5 1.5 1     Cholecalciferol vitamin D3 1000 units 3           Donepezil aricept 10mg       1     Melatonin 3mg       1     Mercaptopurine purinethol 50mg tablet chemo 1            Midodrine proamatine 2.5mg 1 1 1       Quetiapine seroquel 25mg   1/2     1   Selegiline eldepryl 5mg 1 1         Sennosides senna 8.6mg Not taking           Simvastatin zocor 20mg         1/2   Urea carmol 40 40?% cream Not taking                                                           History obtained from patient      Answers for HPI/ROS submitted by the patient on 6/1/2018   General Symptoms: No  Skin Symptoms: No  HENT Symptoms: No  EYE SYMPTOMS: No  HEART SYMPTOMS: No  LUNG SYMPTOMS: No  INTESTINAL SYMPTOMS: No  URINARY SYMPTOMS: No  GYNECOLOGIC SYMPTOMS: No  BREAST SYMPTOMS: No  SKELETAL SYMPTOMS: No  BLOOD SYMPTOMS: No  NERVOUS SYSTEM SYMPTOMS: No  MENTAL HEALTH SYMPTOMS: Yes  Nervous or Anxious: No  Depression: Yes  Trouble sleeping: No  Trouble thinking or concentrating: No  Mood changes: Yes  Panic attacks: No           They have a CNA Xi for 4 hours on Wednesday and he goes fishing  oTnia is in [place]  Lily is in [place]  One has 2 to 4  And the other has 3 under age of 11 yrs.        Will increase the seroquel dose from 1/2 to 1 tab @ 1-2pm to see if can help her problems around 4-6pm     Medications     9-10am 1-2pm 5-6pm 9-10pm 12-2am   Calcium carb-cholecalciferol calcium 500 + D 500+400 mg-unit 1           Carbidopa/levodopa Sinemet CR 50/200         1/2   Carbidopa/levodopa Sinemet  1.5 1.5 1.5 1     Cholecalciferol vitamin D3 1000 units 3           Donepezil aricept 10mg       1     Melatonin 3mg       1     Mercaptopurine purinethol 50mg tablet chemo 1           Midodrine proamatine 2.5mg 1 1 1       Quetiapine seroquel 25mg   1     1   Selegiline eldepryl 5mg 1 1         Sennosides senna 8.6mg Not taking           Simvastatin zocor 20mg         1/2   Tobramycin-dexamethasone tobradex 0.3-0.1%opthalmic suspension                                                           Return back in 6 months.                 ______________________________________      Patient was asked  about 14 Review of systems including changes in vision (dry eyes, double vision), hearing, heart, lungs, musculoskeletal, depression, anxiety, snoring, RBD, insomnia, urinary frequency, urinary urgency, constipation, swallowing problems, hematological, ID, allergies, skin problems: seborrhea, endocrinological: thyroid, diabetes, cholesterol; balance, weight changes, and other neurological problems and these were not significant at this time except for   Patient Active Problem List   Diagnosis     Parkinsonism (H)     Dementia     Crohn disease (H)     Fistula     Nocturia     REM sleep behavior disorder     Vitamin D deficiency     Wears glasses     Glaucoma     Hypercholesteremia     Constipation     Finger fracture     Foot fracture     Confusion     Bunion     Cracked skin     Hand pain     Macrocytosis     Chronic renal disease     Chronic kidney disease, stage III (moderate) (H)     Regional enteritis (H)     Lewy body dementia     Parkinsonian syndrome associated with symptomatic orthostatic hypotension (H)     Syncope          Allergies   Allergen Reactions     Citalopram Other (See Comments)     Fainting     Sertraline Other (See Comments)     Fainting     Meperidine      Other reaction(s): Other (See Comments)  Confusion.     No Clinical Screening - See Comments Rash     Itchy eyes.     Demerol      Hay Fever & [A.R.M.]      Mirapex [Pramipexole] Other (See Comments)     Hallucinations     Namenda [Memantine Hydrochloride]      No benefit       Requip [Ropinirole Hydrochloride]      ? confusion     Memantine Rash     Neomycin Rash     Past Surgical History:   Procedure Laterality Date     BIOPSY OF SKIN LESION  october 2014    seborrheic keratosis of left face     EYE SURGERY  jan 2012    left cataract     EYE SURGERY  march 2012    right cataract     fistula repair?       Past Medical History:   Diagnosis Date     Bunion 3/27/2012     Chronic renal disease 9/24/2013     Confusion 9/6/2011     Constipation      1/week     Constipation 9/6/2011     Cracked skin 3/27/2012     Crohn disease (H) 9/2/2011     Crohn's     mercaptopurine     Disorientation     denies hallucinations. Has memory problems     Entero-colonic fistula 2000    related to crohns; [facility]; had a prior one      Falls     once every 3 months     Finger fracture 9/6/2011     Finger fracture, right      Fistula 9/2/2011     Foot fracture      Freezing      Glaucoma     uses travatan and timolol     Glaucoma 9/6/2011     Hand pain 3/27/2012     Hypercholesteremia 9/6/2011     Hypercholesterolaemia     on simvastatin     Macrocytosis 9/24/2013     Nocturia     1-3/noc     Over weight     with pd has gained 55 lbs     Parkinson's disease 1998    left side onset; left handed. dyskinesias     REM sleep behavior disorder     not frequent     Screening colonoscopy 2011     Vitamin D deficiency      Wears glasses      Social History     Socioeconomic History     Marital status:      Spouse name: Not on file     Number of children: Not on file     Years of education: Not on file     Highest education level: Not on file   Occupational History     Not on file   Social Needs     Financial resource strain: Not on file     Food insecurity:     Worry: Not on file     Inability: Not on file     Transportation needs:     Medical: Not on file     Non-medical: Not on file   Tobacco Use     Smoking status: Never Smoker     Smokeless tobacco: Never Used   Substance and Sexual Activity     Alcohol use: No     Drug use: No     Sexual activity: Not Currently   Lifestyle     Physical activity:     Days per week: Not on file     Minutes per session: Not on file     Stress: Not on file   Relationships     Social connections:     Talks on phone: Not on file     Gets together: Not on file     Attends Anabaptist service: Not on file     Active member of club or organization: Not on file     Attends meetings of clubs or organizations: Not on file     Relationship status: Not on  file     Intimate partner violence:     Fear of current or ex partner: Not on file     Emotionally abused: Not on file     Physically abused: Not on file     Forced sexual activity: Not on file   Other Topics Concern     Parent/sibling w/ CABG, MI or angioplasty before 65F 55M? No   Social History Narrative    Living in [place]    Not exercising    No tobacco    No alcohol        FAMILY HISTORY:  Her father  in his 70s from alcoholism.  Her mother is alive and age 94.  She has two sisters and one brother who are healthy.  She has two daughters who are 33 and 35.  She is of [place] background.        SOCIAL HISTORY:  She was born in [place] and grew up in the [place] and then [place].  She came to [place] after college.  She has been  since , i.e., 39 years.  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       Drug and lactation database from the [place] National Library of Medicine:  http://toxnet.nlm.nih.gov/cgi-bin/sis/htmlgen?LACT      B/P: Data Unavailable, T: Data Unavailable, P: Data Unavailable, R: Data Unavailable 0 lbs 0 oz  not currently breastfeeding., There is no height or weight on file to calculate BMI.  Medications and Vitals not listed above were documented in the cart and reviewed by me.     Current Outpatient Medications   Medication Sig Dispense Refill     Calcium Carb-Cholecalciferol (CALCIUM 500 +D) 500-400 MG-UNIT TABS Take by mouth daily 1  TAb @ 9-10am       carbidopa-levodopa (SINEMET CR)  MG CR tablet TAKE 1/2 TABLET BY MOUTH between midnight and 2am 15 tablet 0     carbidopa-levodopa (SINEMET)  MG tablet TAKE 1.5 TABLETS AT 9AM, 1PM, AND 5PM AND 1 TABLET AT 9PM 165 tablet 0     cholecalciferol (VITAMIN D3) 1000 UNIT tablet 3 tabs @ 9-10am 30 tablet      donepezil (ARICEPT) 10 MG tablet 1 tab at 9-10pm 90 tablet 11     melatonin 3 MG tablet 1 tablet at 9-10pm 90 tablet 3     mercaptopurine  (PURINETHOL) 50 MG tablet CHEMO 1 tab @ 9-10am 90 tablet 3     midodrine (PROAMATINE) 2.5 MG tablet 1 tab @ 9-10am, 1 tab @ 1-2pm and 1 tab @ 5-6pm === 3/day 450 tablet 3     QUEtiapine (SEROQUEL) 25 MG tablet Increase to 1 tab by mouth @ 1-2pm and 1 tablet by mouth 12midnight-2am 180 tablet 3     selegiline 5 MG tablet TAKE 1 TABLET BY MOUTH TWICE DAILY AT 9-10AM AND 1-2 PM 60 tablet 2     sennosides (SENNA) 8.6 MG tablet Take 1 tablet by mouth daily as needed for constipation. 100 tablet 3     simvastatin (ZOCOR) 20 MG tablet 1/2 tab @ 12midnight-1am 90 tablet 3     tobramycin-dexamethasone (TOBRADEX) 0.3-0.1 % ophthalmic suspension   1         Ray Bravo MD

## 2019-06-25 ENCOUNTER — TELEPHONE (OUTPATIENT)
Dept: NEUROLOGY | Facility: CLINIC | Age: 74
End: 2019-06-25

## 2019-06-25 ENCOUNTER — COMMUNICATION - HEALTHEAST (OUTPATIENT)
Dept: INTERNAL MEDICINE | Facility: CLINIC | Age: 74
End: 2019-06-25

## 2019-06-25 ENCOUNTER — PATIENT OUTREACH (OUTPATIENT)
Dept: NEUROLOGY | Facility: CLINIC | Age: 74
End: 2019-06-25

## 2019-06-25 DIAGNOSIS — G20.A1 PARKINSON'S DISEASE (H): Primary | ICD-10-CM

## 2019-06-25 DIAGNOSIS — F02.818 DEMENTIA DUE TO MEDICAL CONDITION WITH BEHAVIORAL DISTURBANCE (H): Primary | ICD-10-CM

## 2019-06-25 DIAGNOSIS — F02.818 DEMENTIA DUE TO MEDICAL CONDITION WITH BEHAVIORAL DISTURBANCE (H): ICD-10-CM

## 2019-06-25 NOTE — PROGRESS NOTES
Situation:  Jerica Blas is a 73 year old female who receives support for Parkinson's disease.  Jerica's  has concerns about getting her into clinic for appointments and medication management.    Background:  6/20/2019 MyChart message from Reinaldo expressing difficulty of getting Jerica into a appointment with Dr. Bravo.    6/24/2019 Perlita Grimes and Fabby Logan provided in-home provider care resources    I collaborated with Fabby Santa's () on options for Jerica in her current situation.    Option 1:  Called Dr. Cerda office and spoke to Sarai ([facility]).  Sarai stated Dr. Bravo called earlier and spoke with Dr. Villagomez. Dr. De La Cruz is able to take over medications and medical care. Dr. Villagomez spoke with the  about this on 6/24.    Sarai has no information about any home providers that can see her.  Can he prescribe her Parkinson's disease  medications? Yes    Option 2:  -Transportation to help get her here (at least $65 for a cab)  can help get funds one time to assist with paying.    Option 3:  -[facility] ([phone]) can provide on-going services in the home. They have openings and can take over prescribing medications. They bill insurance for the provider visit and have a $65 out of pocket fee.    Plan:  1. Reinaldo stated at this time he would like to try and get her in to see Dr. Bravo on Thursday June 27 th at 2:10 PM and get his opinion regarding Jerica's current situation. If they are unable to make it they will see her PCP for ongoing care and medication management for her Parkinson's disease.     2. He would like to have [facility] as a last resort, I gave him the number and informed him about the $65 dollar fee. I informed Reinaldo that currently they have openings but this may change. He should call them ASAP if he decides to utilize this resource for Jerica    3. Reinaldo stated he does not need assistance with transportation with  Prisca Najera stated he appreciated the call and coordination.

## 2019-06-25 NOTE — TELEPHONE ENCOUNTER
[facility] Call Center    Phone Message    May a detailed message be left on voicemail: yes    Reason for Call: Other: Per call from Reinaldo received a message from Flory re: Annual Visit and is returning her calls. Per Reinaldo PT has an upcoming APPT with Dr Bravo, 6/27 and it's important that Flory or someone from the clinic calls him back ASAP.     Action Taken: Message routed to:  [facility] ([facility] Neurology

## 2019-06-27 ENCOUNTER — RECORDS - HEALTHEAST (OUTPATIENT)
Dept: ADMINISTRATIVE | Facility: OTHER | Age: 74
End: 2019-06-27

## 2019-06-27 ENCOUNTER — OFFICE VISIT (OUTPATIENT)
Dept: NEUROLOGY | Facility: CLINIC | Age: 74
End: 2019-06-27
Payer: COMMERCIAL

## 2019-06-27 ENCOUNTER — TELEPHONE (OUTPATIENT)
Dept: NEUROLOGY | Facility: CLINIC | Age: 74
End: 2019-06-27

## 2019-06-27 VITALS
OXYGEN SATURATION: 96 % | BODY MASS INDEX: 23.3 KG/M2 | RESPIRATION RATE: 15 BRPM | HEART RATE: 77 BPM | DIASTOLIC BLOOD PRESSURE: 73 MMHG | SYSTOLIC BLOOD PRESSURE: 103 MMHG | WEIGHT: 140 LBS

## 2019-06-27 DIAGNOSIS — G20.A1 PARALYSIS AGITANS (H): ICD-10-CM

## 2019-06-27 DIAGNOSIS — G20.A1 PARKINSON DISEASE (H): Primary | ICD-10-CM

## 2019-06-27 DIAGNOSIS — I95.1 ORTHOSTATIC HYPOTENSION: ICD-10-CM

## 2019-06-27 DIAGNOSIS — Z00.00 ANNUAL PHYSICAL EXAM: ICD-10-CM

## 2019-06-27 DIAGNOSIS — G20.A1 PARKINSON DISEASE (H): ICD-10-CM

## 2019-06-27 DIAGNOSIS — G20.A1 PARKINSON'S DISEASE (H): ICD-10-CM

## 2019-06-27 LAB
ALBUMIN SERPL-MCNC: 3.7 G/DL (ref 3.4–5)
ALP SERPL-CCNC: 61 U/L (ref 40–150)
ALT SERPL W P-5'-P-CCNC: 12 U/L (ref 0–50)
ANION GAP SERPL CALCULATED.3IONS-SCNC: 5 MMOL/L (ref 3–14)
AST SERPL W P-5'-P-CCNC: 17 U/L (ref 0–45)
BASOPHILS # BLD AUTO: 0.1 10E9/L (ref 0–0.2)
BASOPHILS NFR BLD AUTO: 1 %
BILIRUB SERPL-MCNC: 0.6 MG/DL (ref 0.2–1.3)
BUN SERPL-MCNC: 22 MG/DL (ref 7–30)
CALCIUM SERPL-MCNC: 9.1 MG/DL (ref 8.5–10.1)
CHLORIDE SERPL-SCNC: 106 MMOL/L (ref 94–109)
CHOLEST SERPL-MCNC: 158 MG/DL
CO2 SERPL-SCNC: 29 MMOL/L (ref 20–32)
CREAT SERPL-MCNC: 1.1 MG/DL (ref 0.52–1.04)
DIFFERENTIAL METHOD BLD: ABNORMAL
EOSINOPHIL # BLD AUTO: 0.1 10E9/L (ref 0–0.7)
EOSINOPHIL NFR BLD AUTO: 1 %
ERYTHROCYTE [DISTWIDTH] IN BLOOD BY AUTOMATED COUNT: 13.1 % (ref 10–15)
GFR SERPL CREATININE-BSD FRML MDRD: 50 ML/MIN/{1.73_M2}
GLUCOSE SERPL-MCNC: 110 MG/DL (ref 70–99)
HCT VFR BLD AUTO: 47.3 % (ref 35–47)
HDLC SERPL-MCNC: 75 MG/DL
HGB BLD-MCNC: 15.7 G/DL (ref 11.7–15.7)
IMM GRANULOCYTES # BLD: 0 10E9/L (ref 0–0.4)
IMM GRANULOCYTES NFR BLD: 0.2 %
LDLC SERPL CALC-MCNC: 59 MG/DL
LYMPHOCYTES # BLD AUTO: 1.1 10E9/L (ref 0.8–5.3)
LYMPHOCYTES NFR BLD AUTO: 21.5 %
MCH RBC QN AUTO: 34.1 PG (ref 26.5–33)
MCHC RBC AUTO-ENTMCNC: 33.2 G/DL (ref 31.5–36.5)
MCV RBC AUTO: 103 FL (ref 78–100)
MONOCYTES # BLD AUTO: 0.5 10E9/L (ref 0–1.3)
MONOCYTES NFR BLD AUTO: 9.4 %
NEUTROPHILS # BLD AUTO: 3.4 10E9/L (ref 1.6–8.3)
NEUTROPHILS NFR BLD AUTO: 66.9 %
NONHDLC SERPL-MCNC: 83 MG/DL
NRBC # BLD AUTO: 0 10*3/UL
NRBC BLD AUTO-RTO: 0 /100
PLATELET # BLD AUTO: 194 10E9/L (ref 150–450)
POTASSIUM SERPL-SCNC: 5 MMOL/L (ref 3.4–5.3)
PROT SERPL-MCNC: 7.1 G/DL (ref 6.8–8.8)
RBC # BLD AUTO: 4.61 10E12/L (ref 3.8–5.2)
SODIUM SERPL-SCNC: 140 MMOL/L (ref 133–144)
TRIGL SERPL-MCNC: 122 MG/DL
TSH SERPL DL<=0.005 MIU/L-ACNC: 1 MU/L (ref 0.4–4)
WBC # BLD AUTO: 5.1 10E9/L (ref 4–11)

## 2019-06-27 RX ORDER — CARBIDOPA AND LEVODOPA 25; 100 MG/1; MG/1
TABLET ORAL
Qty: 495 TABLET | Refills: 3 | Status: SHIPPED | OUTPATIENT
Start: 2019-06-27 | End: 2020-06-23

## 2019-06-27 RX ORDER — LANOLIN ALCOHOL/MO/W.PET/CERES
CREAM (GRAM) TOPICAL
Qty: 90 TABLET | Refills: 3 | COMMUNITY
Start: 2019-06-27 | End: 2019-06-27

## 2019-06-27 RX ORDER — CARBIDOPA AND LEVODOPA 50; 200 MG/1; MG/1
TABLET, EXTENDED RELEASE ORAL
Qty: 45 TABLET | Refills: 3 | Status: SHIPPED | OUTPATIENT
Start: 2019-06-27 | End: 2020-06-23

## 2019-06-27 RX ORDER — DONEPEZIL HYDROCHLORIDE 10 MG/1
TABLET, FILM COATED ORAL
Qty: 90 TABLET | Refills: 11 | Status: SHIPPED | OUTPATIENT
Start: 2019-06-27 | End: 2020-06-23

## 2019-06-27 RX ORDER — SELEGILINE HYDROCHLORIDE 5 MG/1
TABLET ORAL
Qty: 180 TABLET | Refills: 3 | Status: SHIPPED | OUTPATIENT
Start: 2019-06-27 | End: 2020-06-22

## 2019-06-27 RX ORDER — MERCAPTOPURINE 50 MG/1
TABLET ORAL
Qty: 90 TABLET | Refills: 3 | COMMUNITY
Start: 2019-06-27 | End: 2021-01-01

## 2019-06-27 RX ORDER — LANOLIN ALCOHOL/MO/W.PET/CERES
CREAM (GRAM) TOPICAL
Qty: 90 TABLET | Refills: 3 | COMMUNITY
Start: 2019-06-27 | End: 2021-01-01

## 2019-06-27 RX ORDER — TOBRAMYCIN AND DEXAMETHASONE 3; 1 MG/ML; MG/ML
SUSPENSION/ DROPS OPHTHALMIC
Qty: 10 ML | Refills: 3 | COMMUNITY
Start: 2019-06-27 | End: 2020-06-23

## 2019-06-27 RX ORDER — CHOLECALCIFEROL (VITAMIN D3) 50 MCG
TABLET ORAL
COMMUNITY
Start: 2019-06-27

## 2019-06-27 RX ORDER — SIMVASTATIN 20 MG
TABLET ORAL
Qty: 90 TABLET | Refills: 3 | COMMUNITY
Start: 2019-06-27 | End: 2021-01-01

## 2019-06-27 RX ORDER — QUETIAPINE FUMARATE 25 MG/1
TABLET, FILM COATED ORAL
Qty: 180 TABLET | Refills: 3 | Status: SHIPPED | OUTPATIENT
Start: 2019-06-27 | End: 2020-06-23

## 2019-06-27 RX ORDER — MIDODRINE HYDROCHLORIDE 2.5 MG/1
TABLET ORAL
Qty: 360 TABLET | Refills: 3 | Status: SHIPPED | OUTPATIENT
Start: 2019-06-27 | End: 2020-06-23

## 2019-06-27 ASSESSMENT — PAIN SCALES - GENERAL: PAINLEVEL: NO PAIN (0)

## 2019-06-27 NOTE — LETTER
2019       RE: Jerica Blas  [address] [facility] [address]     Dear Colleague,    Thank you for referring your patient, Jerica Blas, to the [facility]UR[facility] at [facility]. Please see a copy of my visit note below.    Diagnosis/Summary/Recommendations:    PATIENT: Jerica Blas  73 year old female     : 1945    PANKAJ: 2019    Parkinson    Her day has shifted. Getting up at noon and goes to bed at 10pm  She is eating two meals per day.                 Documentation of a Face-to-Face Physician Encounter 2019    Jerica Blas  1945  [id number]    I certify that this patient is under my care and that I, or a nurse practitioner or physician's assistant working with me, had a face-to-face encounter that meets the physician face-to-face encounter requirements with this patient on: 2019.    The encounter with the patient was in whole, or in part, for the following medical condition, which is the primary reason for home health care:  Encounter Diagnoses   Name Primary?     Parkinson disease (H) Yes     Paralysis agitans (H)        I certify that, based on my findings, the following services are medically necessary home health services: Nursing, Occupational Therapy, Physical Therapy, Speech Language Therapy and social work    My clinical findings support the need for the above services because: parkinson/dementia    Further, I certify that my clinical findings support that this patient is homebound (i.e. absences from home require considerable and taxing effort and are for medical reasons or [employer] services or infrequently or of short duration when for other reasons) because: parkinson/dementia      Physician signature ___________________________________   2019  Physician name: Ray Bravo MD    Fax ([phone]) or scan/email ([email or web address]) this completed document to [facility] within 24  hours of the referral date.  Questions: [phone].           Medications     9-10am 1-2pm 5-6pm 9-10pm 12-2am   Calcium carb-cholecalciferol calcium 500 + D 500+400 mg-unit 1           Carbidopa/levodopa Sinemet CR 50/200         1/2   Carbidopa/levodopa Sinemet  1.5 1.5 1.5 1     Cholecalciferol vitamin D3 1000 units 3           Donepezil aricept 10mg       1     Melatonin 3mg       1     Mercaptopurine purinethol 50mg tablet chemo 1           Midodrine proamatine 2.5mg 1 1 1       Quetiapine seroquel 25mg   1     1   Selegiline eldepryl 5mg 1 1         Sennosides senna 8.6mg Not taking           Simvastatin zocor 20mg         1/2   Urea carmol 40 40?% cream Not taking                                                          History obtained from patient    Blood pressure was 103/73 today  Discussed low blood pressure and midodrine.      Medications     1030am 230/3pm 630/7pm 10pm 1230am   Calcium carb-cholecalciferol calcium 500 + D 500+400 mg-unit 2x1/2           Carbidopa/levodopa Sinemet CR 50/200         1/2   Carbidopa/levodopa Sinemet  1.5 1.5 1.5 1     Donepezil aricept 10mg       1     Melatonin 3mg       1     Mercaptopurine purinethol 50mg tablet chemo 1           Midodrine proamatine 2.5mg 1 1 1       Quetiapine seroquel 25mg   1     1   Selegiline eldepryl 5mg 1 1         Simvastatin zocor 20mg         1/2   Tobramycin-dexamethasone tobradex 0.3-0.1% ophthalmic suspesion As needed       Vitamin D3 1000 units 1           Vitamin D3 2000 units 1                           PLAN  1. Simplify regimen - by reducing the vitamin D3 to only 2000 units per day. Stop the 1000 unit dose.     2.  wanted me to refill medications for next year so I did this for all her medications except for her statin    3. Will order routine blood work so is available for Dr. Villagomez    4. Updated her medication list including the eliminated 1000 units of vitamin d3    5. If she is having more fainting spells may need to  use an additional 2.5mg dose of midodrine/proamatine. Not clear if there is a specific time when these are happening.         Medications     1030am 230/3pm 630/7pm 10pm 1230am   Calcium carb-cholecalciferol calcium 500 + D 500+400 mg-unit 2 x 1/2 tabs           Carbidopa/levodopa Sinemet CR 50/200         1/2   Carbidopa/levodopa Sinemet  1.5 1.5 1.5 1     Donepezil aricept 10mg       1     Melatonin 3mg       1     Mercaptopurine purinethol 50mg tablet chemo 1           Midodrine proamatine 2.5mg 1 1 1       Quetiapine seroquel 25mg   1     1   Selegiline eldepryl 5mg 1 1         Simvastatin zocor 20mg         1/2   Tobramycin-dexamethasone tobradex 0.3-0.1% ophthalmic suspesion As needed       Vitamin D3 2000 units 1                             Coding statement:   Duration of  Services: patient care and care coordination was 25 minutes  Greater than 50% of this visit was spent in counseling and coordination of care.     Ray Bravo MD     ______________________________________    Last visit date and details:     Answers for HPI/ROS submitted by the patient on 6/25/2019   General Symptoms: No  Skin Symptoms: No  HENT Symptoms: No  EYE SYMPTOMS: No  HEART SYMPTOMS: No  LUNG SYMPTOMS: No  INTESTINAL SYMPTOMS: No  URINARY SYMPTOMS: No  GYNECOLOGIC SYMPTOMS: No  BREAST SYMPTOMS: No  SKELETAL SYMPTOMS: No  BLOOD SYMPTOMS: No  NERVOUS SYSTEM SYMPTOMS: No  MENTAL HEALTH SYMPTOMS: No    PANKAJ: June 8, 2018     Parkinsonism  orthosttic hypotension     Kept having low blood pressure with antidepressants  She is not being walked early in the morning  Walks 50 feet later in the day     Not taking anything for constipation  She is having a bowel movement a few times per week  Discussed this     She is wearing depends     She has hallucinations - seeing little girl and worried about her.      She has had issues between 4-6pm      Medications     9-10am 1-2pm 5-6pm 9-10pm 12-2am   Calcium carb-cholecalciferol calcium 500 + D  500+400 mg-unit 1           Carbidopa/levodopa Sinemet CR 50/200         1/2   Carbidopa/levodopa Sinemet  1.5 1.5 1.5 1     Cholecalciferol vitamin D3 1000 units 3           Donepezil aricept 10mg       1     Melatonin 3mg       1     Mercaptopurine purinethol 50mg tablet chemo 1           Midodrine proamatine 2.5mg 1 1 1       Quetiapine seroquel 25mg   1/2     1   Selegiline eldepryl 5mg 1 1         Sennosides senna 8.6mg Not taking           Simvastatin zocor 20mg         1/2   Urea carmol 40 40?% cream Not taking                                                           History obtained from patient      Answers for HPI/ROS submitted by the patient on 6/1/2018   General Symptoms: No  Skin Symptoms: No  HENT Symptoms: No  EYE SYMPTOMS: No  HEART SYMPTOMS: No  LUNG SYMPTOMS: No  INTESTINAL SYMPTOMS: No  URINARY SYMPTOMS: No  GYNECOLOGIC SYMPTOMS: No  BREAST SYMPTOMS: No  SKELETAL SYMPTOMS: No  BLOOD SYMPTOMS: No  NERVOUS SYSTEM SYMPTOMS: No  MENTAL HEALTH SYMPTOMS: Yes  Nervous or Anxious: No  Depression: Yes  Trouble sleeping: No  Trouble thinking or concentrating: No  Mood changes: Yes  Panic attacks: No           They have a CNA Xi for 4 hours on Wednesday and he goes fishing  Tonia is in [place]  Lily is in [place]  One has 2 to 4  And the other has 3 under age of 11 yrs.        Will increase the seroquel dose from 1/2 to 1 tab @ 1-2pm to see if can help her problems around 4-6pm     Medications     9-10am 1-2pm 5-6pm 9-10pm 12-2am   Calcium carb-cholecalciferol calcium 500 + D 500+400 mg-unit 1           Carbidopa/levodopa Sinemet CR 50/200         1/2   Carbidopa/levodopa Sinemet  1.5 1.5 1.5 1     Cholecalciferol vitamin D3 1000 units 3           Donepezil aricept 10mg       1     Melatonin 3mg       1     Mercaptopurine purinethol 50mg tablet chemo 1           Midodrine proamatine 2.5mg 1 1 1       Quetiapine seroquel 25mg   1     1   Selegiline eldepryl 5mg 1 1         Sennosides senna  8.6mg Not taking           Simvastatin zocor 20mg         1/2   Tobramycin-dexamethasone tobradex 0.3-0.1%opthalmic suspension                                                           Return back in 6 months.                 ______________________________________      Patient was asked about 14 Review of systems including changes in vision (dry eyes, double vision), hearing, heart, lungs, musculoskeletal, depression, anxiety, snoring, RBD, insomnia, urinary frequency, urinary urgency, constipation, swallowing problems, hematological, ID, allergies, skin problems: seborrhea, endocrinological: thyroid, diabetes, cholesterol; balance, weight changes, and other neurological problems and these were not significant at this time except for   Patient Active Problem List   Diagnosis     Parkinsonism (H)     Dementia     Crohn disease (H)     Fistula     Nocturia     REM sleep behavior disorder     Vitamin D deficiency     Wears glasses     Glaucoma     Hypercholesteremia     Constipation     Finger fracture     Foot fracture     Confusion     Bunion     Cracked skin     Hand pain     Macrocytosis     Chronic renal disease     Chronic kidney disease, stage III (moderate) (H)     Regional enteritis (H)     Lewy body dementia     Parkinsonian syndrome associated with symptomatic orthostatic hypotension (H)     Syncope          Allergies   Allergen Reactions     Citalopram Other (See Comments)     Fainting     Sertraline Other (See Comments)     Fainting     Meperidine      Other reaction(s): Other (See Comments)  Confusion.     No Clinical Screening - See Comments Rash     Itchy eyes.     Demerol      Hay Fever & [A.R.M.]      Mirapex [Pramipexole] Other (See Comments)     Hallucinations     Namenda [Memantine Hydrochloride]      No benefit       Requip [Ropinirole Hydrochloride]      ? confusion     Memantine Rash     Neomycin Rash     Past Surgical History:   Procedure Laterality Date     BIOPSY OF SKIN LESION  october 2014     seborrheic keratosis of left face     EYE SURGERY  jan 2012    left cataract     EYE SURGERY  march 2012    right cataract     fistula repair?       Past Medical History:   Diagnosis Date     Bunion 3/27/2012     Chronic renal disease 9/24/2013     Confusion 9/6/2011     Constipation     1/week     Constipation 9/6/2011     Cracked skin 3/27/2012     Crohn disease (H) 9/2/2011     Crohn's     mercaptopurine     Disorientation     denies hallucinations. Has memory problems     Entero-colonic fistula 2000    related to crohns; [facility]; had a prior one      Falls     once every 3 months     Finger fracture 9/6/2011     Finger fracture, right      Fistula 9/2/2011     Foot fracture      Freezing      Glaucoma     uses travatan and timolol     Glaucoma 9/6/2011     Hand pain 3/27/2012     Hypercholesteremia 9/6/2011     Hypercholesterolaemia     on simvastatin     Macrocytosis 9/24/2013     Nocturia     1-3/noc     Over weight     with pd has gained 55 lbs     Parkinson's disease 1998    left side onset; left handed. dyskinesias     REM sleep behavior disorder     not frequent     Screening colonoscopy 2011     Vitamin D deficiency      Wears glasses      Social History     Socioeconomic History     Marital status:      Spouse name: Not on file     Number of children: Not on file     Years of education: Not on file     Highest education level: Not on file   Occupational History     Not on file   Social Needs     Financial resource strain: Not on file     Food insecurity:     Worry: Not on file     Inability: Not on file     Transportation needs:     Medical: Not on file     Non-medical: Not on file   Tobacco Use     Smoking status: Never Smoker     Smokeless tobacco: Never Used   Substance and Sexual Activity     Alcohol use: No     Drug use: No     Sexual activity: Not Currently   Lifestyle     Physical activity:     Days per week: Not on file     Minutes per session: Not on file     Stress: Not on file    Relationships     Social connections:     Talks on phone: Not on file     Gets together: Not on file     Attends [employer] service: Not on file     Active member of club or organization: Not on file     Attends meetings of clubs or organizations: Not on file     Relationship status: Not on file     Intimate partner violence:     Fear of current or ex partner: Not on file     Emotionally abused: Not on file     Physically abused: Not on file     Forced sexual activity: Not on file   Other Topics Concern     Parent/sibling w/ CABG, MI or angioplasty before 65F 55M? No   Social History Narrative    Living in [place]    Not exercising    No tobacco    No alcohol        FAMILY HISTORY:  Her father  in his 70s from alcoholism.  Her mother is alive and age 94.  She has two sisters and one brother who are healthy.  She has two daughters who are 33 and 35.  She is of [place] background.        SOCIAL HISTORY:  She was born in [place] and grew up in the [place] and then [place].  She came to [place] after college.  She has been  since , i.e., 39 years.  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       Drug and lactation database from the [place] National Library of Medicine:  http://toxnet.nlm.nih.gov/cgi-bin/sis/htmlgen?LACT      B/P: Data Unavailable, T: Data Unavailable, P: Data Unavailable, R: Data Unavailable 0 lbs 0 oz  not currently breastfeeding., There is no height or weight on file to calculate BMI.  Medications and Vitals not listed above were documented in the cart and reviewed by me.     Current Outpatient Medications   Medication Sig Dispense Refill     Calcium Carb-Cholecalciferol (CALCIUM 500 +D) 500-400 MG-UNIT TABS Take by mouth daily 1  TAb @ 9-10am       carbidopa-levodopa (SINEMET CR)  MG CR tablet TAKE 1/2 TABLET BY MOUTH between midnight and 2am 15 tablet 0     carbidopa-levodopa (SINEMET)  MG tablet TAKE  1.5 TABLETS AT 9AM, 1PM, AND 5PM AND 1 TABLET AT 9PM 165 tablet 0     cholecalciferol (VITAMIN D3) 1000 UNIT tablet 3 tabs @ 9-10am 30 tablet      donepezil (ARICEPT) 10 MG tablet 1 tab at 9-10pm 90 tablet 11     melatonin 3 MG tablet 1 tablet at 9-10pm 90 tablet 3     mercaptopurine (PURINETHOL) 50 MG tablet CHEMO 1 tab @ 9-10am 90 tablet 3     midodrine (PROAMATINE) 2.5 MG tablet 1 tab @ 9-10am, 1 tab @ 1-2pm and 1 tab @ 5-6pm === 3/day 450 tablet 3     QUEtiapine (SEROQUEL) 25 MG tablet Increase to 1 tab by mouth @ 1-2pm and 1 tablet by mouth 12midnight-2am 180 tablet 3     selegiline 5 MG tablet TAKE 1 TABLET BY MOUTH TWICE DAILY AT 9-10AM AND 1-2 PM 60 tablet 2     sennosides (SENNA) 8.6 MG tablet Take 1 tablet by mouth daily as needed for constipation. 100 tablet 3     simvastatin (ZOCOR) 20 MG tablet 1/2 tab @ 12midnight-1am 90 tablet 3     tobramycin-dexamethasone (TOBRADEX) 0.3-0.1 % ophthalmic suspension   1         Ray Bravo MD

## 2019-06-27 NOTE — PATIENT INSTRUCTIONS
JOSE  1. Simplify regimen - by reducing the vitamin D3 to only 2000 units per day. Stop the 1000 unit dose.     2.  wanted me to refill medications for next year so I did this for all her medications except for her statin    3. Will order routine blood work so is available for Dr. Villagomez    4. Updated her medication list including the eliminated 1000 units of vitamin d3    5. If she is having more fainting spells may need to use an additional 2.5mg dose of midodrine/proamatine. Not clear if there is a specific time when these are happening.         Medications     1030am 230/3pm 630/7pm 10pm 1230am   Calcium carb-cholecalciferol calcium 500 + D 500+400 mg-unit 2 x 1/2 tabs           Carbidopa/levodopa Sinemet CR 50/200         1/2   Carbidopa/levodopa Sinemet  1.5 1.5 1.5 1     Donepezil aricept 10mg       1     Melatonin 3mg       1     Mercaptopurine purinethol 50mg tablet chemo 1           Midodrine proamatine 2.5mg 1 1 1       Quetiapine seroquel 25mg   1     1   Selegiline eldepryl 5mg 1 1         Simvastatin zocor 20mg         1/2   Tobramycin-dexamethasone tobradex 0.3-0.1% ophthalmic suspesion As needed       Vitamin D3 2000 units 1

## 2019-06-27 NOTE — NURSING NOTE
Chief Complaint   Patient presents with     Parkinson     [facility] RETURN MOVEMENT DISORDER F/U       Pascual Morelos, EMT

## 2019-07-02 ENCOUNTER — PATIENT OUTREACH (OUTPATIENT)
Dept: CARE COORDINATION | Facility: CLINIC | Age: 74
End: 2019-07-02

## 2019-07-02 NOTE — PROGRESS NOTES
Social Work Intervention  [facility] [place]    Data/Intervention:    Patient Name:  Jerica Blas  /Age:  1945 (73 year old)    Visit Type: telephone  Referral Source: Soha Keane RN  Reason for Referral:  Discuss emergency care options for Jerica if spouse is sick    Collaborated With:    -Reinaldo, Pt's spouse    Patient Concerns/Issues:   Pt's spouse Reinaldo was ill recently and it made him think about what would happen if he wasn't able to care for Jerica due to his own health issues.    Intervention/Education/Resources Provided:  Reviewed that scenario and indicated that if an emergency, would he be able to call his dtrs to request their immediate assistance until further care can be found? He wasn't sure they would be willing to help. Discussed that he could contact [facility] which he uses on occasion for private pay home care to see if they have staff available, but Pt would likely need to go to an ER to be urgently placed into a nursing home due to the level of care she needs and inability to be alone at home.   Reviewed also whether [place] like [place] find out if they are eligible for any [facility] services at home. He thinks they had one in the past and not sure he is interested but took down the phone #. He uses [facility] mostly in the summer so he can get out to go fishing.    Assessment/Plan:  Reinaldo appreciated the call. They have limited support system to draw upon in an emergency and she has high care needs. Encouraged him to discuss it with his dtrs.    Provided patient/family with contact information and availability.    Fabby Logan, FLORA, [facility]    [facility] [place]  [phone]

## 2019-08-01 ENCOUNTER — COMMUNICATION - HEALTHEAST (OUTPATIENT)
Dept: INTERNAL MEDICINE | Facility: CLINIC | Age: 74
End: 2019-08-01

## 2019-08-01 DIAGNOSIS — K50.118 CROHN'S COLITIS, OTHER COMPLICATION (H): ICD-10-CM

## 2019-09-15 ENCOUNTER — COMMUNICATION - HEALTHEAST (OUTPATIENT)
Dept: INTERNAL MEDICINE | Facility: CLINIC | Age: 74
End: 2019-09-15

## 2019-09-15 DIAGNOSIS — E78.5 HYPERLIPIDEMIA: ICD-10-CM

## 2019-10-29 ENCOUNTER — COMMUNICATION - HEALTHEAST (OUTPATIENT)
Dept: INTERNAL MEDICINE | Facility: CLINIC | Age: 74
End: 2019-10-29

## 2019-10-29 DIAGNOSIS — K50.118 CROHN'S COLITIS, OTHER COMPLICATION (H): ICD-10-CM

## 2020-01-29 ENCOUNTER — COMMUNICATION - HEALTHEAST (OUTPATIENT)
Dept: INTERNAL MEDICINE | Facility: CLINIC | Age: 75
End: 2020-01-29

## 2020-01-29 DIAGNOSIS — K50.118 CROHN'S COLITIS, OTHER COMPLICATION (H): ICD-10-CM

## 2020-03-01 ENCOUNTER — HEALTH MAINTENANCE LETTER (OUTPATIENT)
Age: 75
End: 2020-03-01

## 2020-03-14 ENCOUNTER — COMMUNICATION - HEALTHEAST (OUTPATIENT)
Dept: INTERNAL MEDICINE | Facility: CLINIC | Age: 75
End: 2020-03-14

## 2020-03-14 DIAGNOSIS — E78.5 HYPERLIPIDEMIA: ICD-10-CM

## 2020-04-23 ENCOUNTER — DOCUMENTATION ONLY (OUTPATIENT)
Dept: CARE COORDINATION | Facility: CLINIC | Age: 75
End: 2020-04-23

## 2020-04-26 ENCOUNTER — COMMUNICATION - HEALTHEAST (OUTPATIENT)
Dept: INTERNAL MEDICINE | Facility: CLINIC | Age: 75
End: 2020-04-26

## 2020-04-26 DIAGNOSIS — K50.118 CROHN'S COLITIS, OTHER COMPLICATION (H): ICD-10-CM

## 2020-06-10 ENCOUNTER — MYC MEDICAL ADVICE (OUTPATIENT)
Dept: NEUROLOGY | Facility: CLINIC | Age: 75
End: 2020-06-10

## 2020-06-10 NOTE — PROGRESS NOTES
"    VIDEO VISIT - converted into a telephone visit     Date of Visit: June 23, 2020  Name: Jerica Blas  Date of Birth 1945  [facility] [id number] [phone] (H)  [email or web address]  Has mychart    No proxy  Lily Poe daughter  Reinaldo Najera   [phone]    Assessment:  (G20) Parkinson disease (H)  (primary encounter diagnosis)  Appetite and bowel movements are good  She has had some teeth grinding    Medications     1030am 230/3pm 630/7pm 10pm 1230am   Calcium carb-cholecalciferol calcium 500 + D 500+400 mg-unit 2 x 1/2 tabs           Carbidopa/levodopa Sinemet CR 50/200         1/2   Carbidopa/levodopa Sinemet  1.5 1.5 1.5 1     Donepezil aricept 10mg       1     Melatonin 3mg       1     Mercaptopurine purinethol 50mg tablet chemo 1           Midodrine proamatine 2.5mg 1 1 1       Quetiapine seroquel 25mg   1     1   Selegiline eldepryl 5mg 1 1         Simvastatin zocor 20mg         1/2   Vitamin D3 2000 units 50 mcg 1              Plan:  Refilled all medications other than those that are over counter and did not fill the mercaptopurine - from Dr. Josué Cole (?GI) and the simvastatin/zocor and is getting this from Dr. Villagomez.    Added Xi (Sammi Poe proxy access to her records.     Return back in one year's time.     I have reviewed the note as documented above.  This accurately captures the substance of my conversation with the patient.  Patient contact time  20  minutes. Over 50% of this visit was spent in patient care and care coordination.     Visit time 235pm - 255pm     Ray Bravo MD      ------------------------------------------------------------------------------------------------------------------------------------------------------------------------      Telephone-Visit Details    The patient has been notified of following:     \"After a review of the patient s situation, this visit was changed from an in-person visit to a telephone visit to reduce the risk of " "COVID-19 exposure.   The patient is being evaluated via a billable telephone visit.\"    \"This Telephone visit will be conducted via a call between you and your physician/provider. We have found that certain health care needs can be provided without the need for an in-person physical exam.  This service lets us provide the care you need with a telephone  conversation.  If a prescription is necessary we can send it directly to your pharmacy.  If lab work is needed we can place an order for that and you can then stop by our lab to have the test done at a later time.    If during the course of the call the physician/provider feels a telephone visit is not appropriate, you will not be charged for this service.\"     Patient has given verbal consent for Telephone visit? Yes    Type of service:  Telephone visit     Duration of Visit:     Originating Location (pt. Location): home    Distant Location (provider location):  [facility]     Mode of Communication:  Telephone      Video-Visit Details    The patient has been notified of following:     \"After a review of the patient s situation, this visit was changed from an in-person visit to a video visit to reduce the risk of COVID-19 exposure.   The patient is being evaluated via a billable video visit.\"    \"This video visit will be conducted via a call between you and your physician/provider. We have found that certain health care needs can be provided without the need for an in-person physical exam.  This service lets us provide the care you need with a video conversation.  If a prescription is necessary we can send it directly to your pharmacy.  If lab work is needed we can place an order for that and you can then stop by our lab to have the test done at a later time.    If during the course of the call the physician/provider feels a video visit is not appropriate, you will not be charged for this service.\"     Patient has given verbal consent for Video visit? " Yes    Patient would like the video invitation sent by:     Type of service:  Video Visit    Video Start Time:     Video End Time (time video stopped):     Duration:  minutes - see above    Originating Location (pt. Location):     Distant Location (provider location):  [facility]UR[facility]     Mode of Communication:  Video Conference via [employer] (and if not possible then [employer])      Ray Bravo MD      --------------------------------------------------------------------------------------------------------------    Jerica Blas is a 74 year old female who is being evaluated via a billable video visit.      Charts reviewed  Consult from  Images reviewed        I have reviewed and updated the patient's Past Medical History, Social History, Family History and Medication List.    ALLERGIES  Citalopram; Sertraline; Meperidine; No clinical screening - see comments; Demerol; Hay fever & [a.r.m.]; Mirapex [pramipexole]; Namenda [memantine hydrochloride]; Requip [ropinirole hydrochloride]; Ropinirole; Memantine; and Neomycin    Lasts visit details if there was a last visit:         Medications                                                                                                                                                                                                              14 Review of systems  are negative except for   Patient Active Problem List   Diagnosis     Parkinsonism (H)     Dementia (H)     Crohn disease (H)     Fistula     Nocturia     REM sleep behavior disorder     Vitamin D deficiency     Wears glasses     Glaucoma     Hypercholesteremia     Constipation     Finger fracture     Foot fracture     Confusion     Bunion     Cracked skin     Hand pain     Macrocytosis     Chronic renal disease     Chronic kidney disease, stage III (moderate) (H)     Regional enteritis (H)     Lewy body dementia (H)     Parkinsonian syndrome associated with symptomatic orthostatic hypotension (H)      Syncope        Allergies   Allergen Reactions     Citalopram Other (See Comments)     Fainting     Sertraline Other (See Comments)     Fainting     Meperidine Unknown     Other reaction(s): Other (See Comments)  Confusion.     No Clinical Screening - See Comments Rash     Itchy eyes.     Demerol      Hay Fever & [A.R.M.]      Mirapex [Pramipexole] Other (See Comments)     Hallucinations     Namenda [Memantine Hydrochloride]      No benefit       Requip [Ropinirole Hydrochloride]      ? confusion     Ropinirole Unknown     Memantine Rash     Neomycin Rash     Past Surgical History:   Procedure Laterality Date     BIOPSY OF SKIN LESION  october 2014    seborrheic keratosis of left face     EYE SURGERY  jan 2012    left cataract     EYE SURGERY  march 2012    right cataract     fistula repair?       Past Medical History:   Diagnosis Date     Bunion 3/27/2012     Chronic renal disease 9/24/2013     Confusion 9/6/2011     Constipation     1/week     Constipation 9/6/2011     Cracked skin 3/27/2012     Crohn disease (H) 9/2/2011     Crohn's     mercaptopurine     Disorientation     denies hallucinations. Has memory problems     Entero-colonic fistula 2000    related to crohns; [facility]; had a prior one      Falls     once every 3 months     Finger fracture 9/6/2011     Finger fracture, right      Fistula 9/2/2011     Foot fracture      Freezing      Glaucoma     uses travatan and timolol     Glaucoma 9/6/2011     Hand pain 3/27/2012     Hypercholesteremia 9/6/2011     Hypercholesterolaemia     on simvastatin     Macrocytosis 9/24/2013     Nocturia     1-3/noc     Over weight     with pd has gained 55 lbs     Parkinson's disease 1998    left side onset; left handed. dyskinesias     REM sleep behavior disorder     not frequent     Screening colonoscopy 2011     Vitamin D deficiency      Wears glasses      Social History     Socioeconomic History     Marital status:      Spouse name: Not on file     Number of  children: Not on file     Years of education: Not on file     Highest education level: Not on file   Occupational History     Not on file   Social Needs     Financial resource strain: Not on file     Food insecurity     Worry: Not on file     Inability: Not on file     Transportation needs     Medical: Not on file     Non-medical: Not on file   Tobacco Use     Smoking status: Never Smoker     Smokeless tobacco: Never Used   Substance and Sexual Activity     Alcohol use: No     Drug use: No     Sexual activity: Not Currently   Lifestyle     Physical activity     Days per week: Not on file     Minutes per session: Not on file     Stress: Not on file   Relationships     Social connections     Talks on phone: Not on file     Gets together: Not on file     Attends Congregation service: Not on file     Active member of club or organization: Not on file     Attends meetings of clubs or organizations: Not on file     Relationship status: Not on file     Intimate partner violence     Fear of current or ex partner: Not on file     Emotionally abused: Not on file     Physically abused: Not on file     Forced sexual activity: Not on file   Other Topics Concern     Parent/sibling w/ CABG, MI or angioplasty before 65F 55M? No   Social History Narrative    Living in [place]    Not exercising    No tobacco    No alcohol        FAMILY HISTORY:  Her father  in his 70s from alcoholism.  Her mother is alive and age 94.  She has two sisters and one brother who are healthy.  She has two daughters who are 33 and 35.  She is of [place] background.        SOCIAL HISTORY:  She was born in [place] and grew up in the [place] and then [place].  She came to [place] after college.  She has been  since , i.e., 39 years.  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     Family History   Problem Relation Age of Onset     Heart Disease Mother         94 and alive      Cerebrovascular Disease Mother      Alcohol/Drug Father         70s  in 80s; [place] ; [place]; [place]     Current Outpatient Medications   Medication Sig Dispense Refill     Calcium Carb-Cholecalciferol (CALCIUM 500 +D) 500-400 MG-UNIT TABS Take by mouth daily 1  TAb @ 1030am       carbidopa-levodopa (SINEMET CR)  MG CR tablet 1/2 TABLET BY MOUTH@ 1230am 45 tablet 3     carbidopa-levodopa (SINEMET)  MG tablet 1.5 TABLETS AT 1030AM, 230/3PM, 630/7PM AND 1 TABLET AT 10PM 495 tablet 3     donepezil (ARICEPT) 10 MG tablet 10mg tab by mouth nightly @ 10pm 90 tablet 11     melatonin 3 MG tablet 3mg tab by mouth nightly @ 10pm 90 tablet 3     mercaptopurine (PURINETHOL) 50 MG tablet CHEMO 50mg tab by mouth daily @ 1030am 90 tablet 3     midodrine (PROAMATINE) 2.5 MG tablet 2.5mg tab by mouth 3/day @ 1030am, 230/3pm and 630/7pm and extra 2.5mg tab by mouth daily as needed = 4/day 360 tablet 3     QUEtiapine (SEROQUEL) 25 MG tablet 25mg tab by mouth @ 230/3pm and @1230am  = 2/day 180 tablet 3     selegiline 5 MG tablet 5mg tab by mouth twice daily @ 1030am and 230/3pm 180 tablet 3     simvastatin (ZOCOR) 20 MG tablet 1/2 x 20mgtab @ 1230am 90 tablet 3     tobramycin-dexamethasone (TOBRADEX) 0.3-0.1 % ophthalmic suspension As needed 10 mL 3     vitamin D3 (CHOLECALCIFEROL) 1000 units (25 mcg) tablet 1000 unit tab by mouth daily @ 1030am 30 tablet      vitamin D3 (CHOLECALCIFEROL) 2000 units (50 mcg) tablet 2000 unit tab by mouth daily @ 1030am

## 2020-06-20 DIAGNOSIS — G20.A1 PARKINSON'S DISEASE (H): ICD-10-CM

## 2020-06-22 RX ORDER — SELEGILINE HYDROCHLORIDE 5 MG/1
TABLET ORAL
Qty: 180 TABLET | Refills: 3 | Status: SHIPPED | OUTPATIENT
Start: 2020-06-22 | End: 2020-06-23

## 2020-06-22 NOTE — TELEPHONE ENCOUNTER
Rx Authorization:    Requested Medication/ Dose: Selegiline HCI oral tabs 5MG    Date last refill ordered: 6/27/19    Quantity ordered: 180 tab    # refills: 3    Date of last clinic visit with ordering provider: 6/27/19    Date of next clinic visit with ordering provider: 6/23/20    All pertinent protocol data (lab date/result):     Include pertinent information from patients message:

## 2020-06-23 ENCOUNTER — VIRTUAL VISIT (OUTPATIENT)
Dept: NEUROLOGY | Facility: CLINIC | Age: 75
End: 2020-06-23
Payer: COMMERCIAL

## 2020-06-23 ENCOUNTER — RECORDS - HEALTHEAST (OUTPATIENT)
Dept: ADMINISTRATIVE | Facility: OTHER | Age: 75
End: 2020-06-23

## 2020-06-23 ENCOUNTER — MYC MEDICAL ADVICE (OUTPATIENT)
Dept: NEUROLOGY | Facility: CLINIC | Age: 75
End: 2020-06-23

## 2020-06-23 DIAGNOSIS — G20.A1 PARALYSIS AGITANS (H): ICD-10-CM

## 2020-06-23 DIAGNOSIS — I95.1 ORTHOSTATIC HYPOTENSION: ICD-10-CM

## 2020-06-23 DIAGNOSIS — G20.A1 PARKINSON DISEASE (H): Primary | ICD-10-CM

## 2020-06-23 DIAGNOSIS — G20.A1 PARKINSON'S DISEASE (H): ICD-10-CM

## 2020-06-23 RX ORDER — QUETIAPINE FUMARATE 25 MG/1
TABLET, FILM COATED ORAL
Qty: 180 TABLET | Refills: 3 | Status: SHIPPED | OUTPATIENT
Start: 2020-06-23 | End: 2021-01-01

## 2020-06-23 RX ORDER — CARBIDOPA AND LEVODOPA 25; 100 MG/1; MG/1
TABLET ORAL
Qty: 495 TABLET | Refills: 3 | Status: SHIPPED | OUTPATIENT
Start: 2020-06-23 | End: 2021-01-01

## 2020-06-23 RX ORDER — SELEGILINE HYDROCHLORIDE 5 MG/1
TABLET ORAL
Qty: 180 TABLET | Refills: 3 | Status: SHIPPED | OUTPATIENT
Start: 2020-06-23 | End: 2021-01-01

## 2020-06-23 RX ORDER — CARBIDOPA AND LEVODOPA 50; 200 MG/1; MG/1
TABLET, EXTENDED RELEASE ORAL
Qty: 45 TABLET | Refills: 3 | Status: SHIPPED | OUTPATIENT
Start: 2020-06-23 | End: 2021-01-01

## 2020-06-23 RX ORDER — MIDODRINE HYDROCHLORIDE 2.5 MG/1
TABLET ORAL
Qty: 360 TABLET | Refills: 3 | Status: SHIPPED | OUTPATIENT
Start: 2020-06-23 | End: 2021-01-01

## 2020-06-23 RX ORDER — DONEPEZIL HYDROCHLORIDE 10 MG/1
TABLET, FILM COATED ORAL
Qty: 90 TABLET | Refills: 11 | Status: SHIPPED | OUTPATIENT
Start: 2020-06-23 | End: 2021-01-01

## 2020-06-23 NOTE — PATIENT INSTRUCTIONS
Assessment:  (G20) Parkinson disease (H)  (primary encounter diagnosis)  Appetite and bowel movements are good  She has had some teeth grinding    Medications     1030am 230/3pm 630/7pm 10pm 1230am   Calcium carb-cholecalciferol calcium 500 + D 500+400 mg-unit 2 x 1/2 tabs           Carbidopa/levodopa Sinemet CR 50/200         1/2   Carbidopa/levodopa Sinemet  1.5 1.5 1.5 1     Donepezil aricept 10mg       1     Melatonin 3mg       1     Mercaptopurine purinethol 50mg tablet chemo 1           Midodrine proamatine 2.5mg 1 1 1       Quetiapine seroquel 25mg   1     1   Selegiline eldepryl 5mg 1 1         Simvastatin zocor 20mg         1/2   Vitamin D3 2000 units 50 mcg 1              Plan:  Refilled all medications other than those that are over counter and did not fill the mercaptopurine - from Dr. Josué Cole (?GI) and the simvastatin/zocor and is getting this from Dr. Villagomez.    Added Xi Poe (Beth) proxy access to her records.     Return back in one year's time.

## 2020-06-23 NOTE — LETTER
6/23/2020       RE: Jerica Blas  [address]  [place] [address]     Dear Colleague,    Thank you for referring your patient, Jerica Blas, to the [facility] NEUROLOGY at [facility]. Please see a copy of my visit note below.        VIDEO VISIT - converted into a telephone visit     Date of Visit: June 23, 2020  Name: Jerica Blas  Date of Birth 1945  [place] [id number] [phone] (H)  [email or web address]  Has mychart    No proxy  Lily Lui daughter  Reinaldo Najera   [phone]    Assessment:  (G20) Parkinson disease (H)  (primary encounter diagnosis)  Appetite and bowel movements are good  She has had some teeth grinding    Medications     1030am 230/3pm 630/7pm 10pm 1230am   Calcium carb-cholecalciferol calcium 500 + D 500+400 mg-unit 2 x 1/2 tabs           Carbidopa/levodopa Sinemet CR 50/200         1/2   Carbidopa/levodopa Sinemet  1.5 1.5 1.5 1     Donepezil aricept 10mg       1     Melatonin 3mg       1     Mercaptopurine purinethol 50mg tablet chemo 1           Midodrine proamatine 2.5mg 1 1 1       Quetiapine seroquel 25mg   1     1   Selegiline eldepryl 5mg 1 1         Simvastatin zocor 20mg         1/2   Vitamin D3 2000 units 50 mcg 1              Plan:  Refilled all medications other than those that are over counter and did not fill the mercaptopurine - from Dr. Josué Cole (?GI) and the simvastatin/zocor and is getting this from Dr. Villagomez.    Added Xi (Sammi Poe proxy access to her records.     Return back in one year's time.     I have reviewed the note as documented above.  This accurately captures the substance of my conversation with the patient.  Patient contact time  20  minutes. Over 50% of this visit was spent in patient care and care coordination.     Visit time 235pm - 255pm     Ray Bravo  "MD      ------------------------------------------------------------------------------------------------------------------------------------------------------------------------      Telephone-Visit Details    The patient has been notified of following:     \"After a review of the patient s situation, this visit was changed from an in-person visit to a telephone visit to reduce the risk of COVID-19 exposure.   The patient is being evaluated via a billable telephone visit.\"    \"This Telephone visit will be conducted via a call between you and your physician/provider. We have found that certain health care needs can be provided without the need for an in-person physical exam.  This service lets us provide the care you need with a telephone  conversation.  If a prescription is necessary we can send it directly to your pharmacy.  If lab work is needed we can place an order for that and you can then stop by our lab to have the test done at a later time.    If during the course of the call the physician/provider feels a telephone visit is not appropriate, you will not be charged for this service.\"     Patient has given verbal consent for Telephone visit? Yes    Type of service:  Telephone visit     Duration of Visit:     Originating Location (pt. Location): home    Distant Location (provider location):  [facility] NEUROLOGY     Mode of Communication:  Telephone      Video-Visit Details    The patient has been notified of following:     \"After a review of the patient s situation, this visit was changed from an in-person visit to a video visit to reduce the risk of COVID-19 exposure.   The patient is being evaluated via a billable video visit.\"    \"This video visit will be conducted via a call between you and your physician/provider. We have found that certain health care needs can be provided without the need for an in-person physical exam.  This service lets us provide the care you need with a video conversation.  If a " "prescription is necessary we can send it directly to your pharmacy.  If lab work is needed we can place an order for that and you can then stop by our lab to have the test done at a later time.    If during the course of the call the physician/provider feels a video visit is not appropriate, you will not be charged for this service.\"     Patient has given verbal consent for Video visit? Yes    Patient would like the video invitation sent by:     Type of service:  Video Visit    Video Start Time:     Video End Time (time video stopped):     Duration:  minutes - see above    Originating Location (pt. Location):     Distant Location (provider location):  [facility] NEUROLOGY     Mode of Communication:  Video Conference via MOMENT[employer] (and if not possible then doximity)      Ray Bravo MD      --------------------------------------------------------------------------------------------------------------    Jerica Blas is a 74 year old female who is being evaluated via a billable video visit.      Charts reviewed  Consult from  Images reviewed        I have reviewed and updated the patient's Past Medical History, Social History, Family History and Medication List.    ALLERGIES  Citalopram; Sertraline; Meperidine; No clinical screening - see comments; Demerol; Hay fever & [a.r.m.]; Mirapex [pramipexole]; Namenda [memantine hydrochloride]; Requip [ropinirole hydrochloride]; Ropinirole; Memantine; and Neomycin    Lasts visit details if there was a last visit:         Medications                                                                                                                                                                                                              14 Review of systems  are negative except for   Patient Active Problem List   Diagnosis     Parkinsonism (H)     Dementia (H)     Crohn disease (H)     Fistula     Nocturia     REM sleep behavior disorder     Vitamin D deficiency     Wears " glasses     Glaucoma     Hypercholesteremia     Constipation     Finger fracture     Foot fracture     Confusion     Bunion     Cracked skin     Hand pain     Macrocytosis     Chronic renal disease     Chronic kidney disease, stage III (moderate) (H)     Regional enteritis (H)     Lewy body dementia (H)     Parkinsonian syndrome associated with symptomatic orthostatic hypotension (H)     Syncope        Allergies   Allergen Reactions     Citalopram Other (See Comments)     Fainting     Sertraline Other (See Comments)     Fainting     Meperidine Unknown     Other reaction(s): Other (See Comments)  Confusion.     No Clinical Screening - See Comments Rash     Itchy eyes.     Demerol      Hay Fever & [A.R.M.]      Mirapex [Pramipexole] Other (See Comments)     Hallucinations     Namenda [Memantine Hydrochloride]      No benefit       Requip [Ropinirole Hydrochloride]      ? confusion     Ropinirole Unknown     Memantine Rash     Neomycin Rash     Past Surgical History:   Procedure Laterality Date     BIOPSY OF SKIN LESION  october 2014    seborrheic keratosis of left face     EYE SURGERY  jan 2012    left cataract     EYE SURGERY  march 2012    right cataract     fistula repair?       Past Medical History:   Diagnosis Date     Bunion 3/27/2012     Chronic renal disease 9/24/2013     Confusion 9/6/2011     Constipation     1/week     Constipation 9/6/2011     Cracked skin 3/27/2012     Crohn disease (H) 9/2/2011     Crohn's     mercaptopurine     Disorientation     denies hallucinations. Has memory problems     Entero-colonic fistula 2000    related to crohns; [facility]; had a prior one      Falls     once every 3 months     Finger fracture 9/6/2011     Finger fracture, right      Fistula 9/2/2011     Foot fracture      Freezing      Glaucoma     uses travatan and timolol     Glaucoma 9/6/2011     Hand pain 3/27/2012     Hypercholesteremia 9/6/2011     Hypercholesterolaemia     on simvastatin     Macrocytosis 9/24/2013      Nocturia     1-3/noc     Over weight     with pd has gained 55 lbs     Parkinson's disease 1998    left side onset; left handed. dyskinesias     REM sleep behavior disorder     not frequent     Screening colonoscopy      Vitamin D deficiency      Wears glasses      Social History     Socioeconomic History     Marital status:      Spouse name: Not on file     Number of children: Not on file     Years of education: Not on file     Highest education level: Not on file   Occupational History     Not on file   Social Needs     Financial resource strain: Not on file     Food insecurity     Worry: Not on file     Inability: Not on file     Transportation needs     Medical: Not on file     Non-medical: Not on file   Tobacco Use     Smoking status: Never Smoker     Smokeless tobacco: Never Used   Substance and Sexual Activity     Alcohol use: No     Drug use: No     Sexual activity: Not Currently   Lifestyle     Physical activity     Days per week: Not on file     Minutes per session: Not on file     Stress: Not on file   Relationships     Social connections     Talks on phone: Not on file     Gets together: Not on file     Attends [employer] service: Not on file     Active member of club or organization: Not on file     Attends meetings of clubs or organizations: Not on file     Relationship status: Not on file     Intimate partner violence     Fear of current or ex partner: Not on file     Emotionally abused: Not on file     Physically abused: Not on file     Forced sexual activity: Not on file   Other Topics Concern     Parent/sibling w/ CABG, MI or angioplasty before 65F 55M? No   Social History Narrative    Living in [place]    Not exercising    No tobacco    No alcohol        FAMILY HISTORY:  Her father  in his 70s from alcoholism.  Her mother is alive and age 94.  She has two sisters and one brother who are healthy.  She has two daughters who are 33 and 35.  She is of [place] background.         SOCIAL HISTORY:  She was born in [place] and grew up in the [place] and then [place].  She came to [place] after college.  She has been  since , i.e., 39 years.  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     Family History   Problem Relation Age of Onset     Heart Disease Mother         94 and alive     Cerebrovascular Disease Mother      Alcohol/Drug Father         70s  in 80s; [place] ; [place]; [place]     Current Outpatient Medications   Medication Sig Dispense Refill     Calcium Carb-Cholecalciferol (CALCIUM 500 +D) 500-400 MG-UNIT TABS Take by mouth daily 1  TAb @ 1030am       carbidopa-levodopa (SINEMET CR)  MG CR tablet 1/2 TABLET BY MOUTH@ 1230am 45 tablet 3     carbidopa-levodopa (SINEMET)  MG tablet 1.5 TABLETS AT 1030AM, 230/3PM, 630/7PM AND 1 TABLET AT 10PM 495 tablet 3     donepezil (ARICEPT) 10 MG tablet 10mg tab by mouth nightly @ 10pm 90 tablet 11     melatonin 3 MG tablet 3mg tab by mouth nightly @ 10pm 90 tablet 3     mercaptopurine (PURINETHOL) 50 MG tablet CHEMO 50mg tab by mouth daily @ 1030am 90 tablet 3     midodrine (PROAMATINE) 2.5 MG tablet 2.5mg tab by mouth 3/day @ 1030am, 230/3pm and 630/7pm and extra 2.5mg tab by mouth daily as needed = 4/day 360 tablet 3     QUEtiapine (SEROQUEL) 25 MG tablet 25mg tab by mouth @ 230/3pm and @1230am  = 2/day 180 tablet 3     selegiline 5 MG tablet 5mg tab by mouth twice daily @ 1030am and 230/3pm 180 tablet 3     simvastatin (ZOCOR) 20 MG tablet 1/2 x 20mgtab @ 1230am 90 tablet 3     tobramycin-dexamethasone (TOBRADEX) 0.3-0.1 % ophthalmic suspension As needed 10 mL 3     vitamin D3 (CHOLECALCIFEROL) 1000 units (25 mcg) tablet 1000 unit tab by mouth daily @ 1030am 30 tablet      vitamin D3 (CHOLECALCIFEROL) 2000 units (50 mcg) tablet 2000 unit tab by mouth daily @ 1030am                         Jerica Blas is a 74 year old female who is being evaluated via a  "billable video visit.      The patient has been notified of following:     \"This video visit will be conducted via a call between you and your physician/provider. We have found that certain health care needs can be provided without the need for an in-person physical exam.  This service lets us provide the care you need with a video conversation.  If a prescription is necessary we can send it directly to your pharmacy.  If lab work is needed we can place an order for that and you can then stop by our lab to have the test done at a later time.    Video visits are billed at different rates depending on your insurance coverage.  Please reach out to your insurance provider with any questions.    If during the course of the call the physician/provider feels a video visit is not appropriate, you will not be charged for this service.\"    Patient has given verbal consent for Video visit? Yes    D[employer]im[employer]: [phone]    Will anyone else be joining your video visit? No        Video-Visit Details    Type of service:  Video Visit    Video Start Time:   Video End Time:     Originating Location (pt. Location): Home    Distant Location (provider location):   [facility] NEUROLOGY     Platform used for Video Visit: Unable to complete video visit    Ray Bravo MD          "

## 2020-06-23 NOTE — PROGRESS NOTES
"Jerica Blas is a 74 year old female who is being evaluated via a billable video visit.      The patient has been notified of following:     \"This video visit will be conducted via a call between you and your physician/provider. We have found that certain health care needs can be provided without the need for an in-person physical exam.  This service lets us provide the care you need with a video conversation.  If a prescription is necessary we can send it directly to your pharmacy.  If lab work is needed we can place an order for that and you can then stop by our lab to have the test done at a later time.    Video visits are billed at different rates depending on your insurance coverage.  Please reach out to your insurance provider with any questions.    If during the course of the call the physician/provider feels a video visit is not appropriate, you will not be charged for this service.\"    Patient has given verbal consent for Video visit? Yes    D[employer]imity: [phone]    Will anyone else be joining your video visit? No        Video-Visit Details    Type of service:  Video Visit    Video Start Time:   Video End Time:     Originating Location (pt. Location): Home    Distant Location (provider location):   [facility] NEUROLOG[facility]     Platform used for Video Visit: Unable to complete video visit    Ray Bravo MD        "

## 2020-07-28 ENCOUNTER — COMMUNICATION - HEALTHEAST (OUTPATIENT)
Dept: INTERNAL MEDICINE | Facility: CLINIC | Age: 75
End: 2020-07-28

## 2020-07-28 DIAGNOSIS — K50.118 CROHN'S COLITIS, OTHER COMPLICATION (H): ICD-10-CM

## 2020-08-07 DIAGNOSIS — G20.A1 PARKINSON DISEASE (H): ICD-10-CM

## 2020-08-07 RX ORDER — CARBIDOPA AND LEVODOPA 50; 200 MG/1; MG/1
TABLET, EXTENDED RELEASE ORAL
Qty: 45 TABLET | Refills: 0 | OUTPATIENT
Start: 2020-08-07

## 2020-08-14 ENCOUNTER — TELEPHONE (OUTPATIENT)
Dept: NEUROLOGY | Facility: CLINIC | Age: 75
End: 2020-08-14

## 2020-08-14 DIAGNOSIS — G20.A1 PARKINSON DISEASE (H): ICD-10-CM

## 2020-08-14 RX ORDER — CARBIDOPA AND LEVODOPA 50; 200 MG/1; MG/1
TABLET, EXTENDED RELEASE ORAL
Qty: 45 TABLET | Refills: 3 | Status: CANCELLED | OUTPATIENT
Start: 2020-08-14

## 2020-08-14 NOTE — TELEPHONE ENCOUNTER
Rx Authorization:    Requested Medication/ Dosecarbidopa-levodopa (SINEMET CR)  MG CR tablet     Date last refill ordered: 6/23/20    Quantity ordered: 45    # refills: 3    Date of last clinic visit with ordering provider: 6/23/20    Date of next clinic visit with ordering provider:     All pertinent protocol data (lab date/result):     Include pertinent information from patients message:

## 2020-08-17 DIAGNOSIS — G20.A1 PARKINSON DISEASE (H): ICD-10-CM

## 2020-08-17 NOTE — TELEPHONE ENCOUNTER
Called [facility] pharmacy and spoke to Savannah. Savannah reported that the carbidopa/levodopa CR  was picked up for 45 tablets on 8/15 12:07 PM.    Reinaldo reported that 10 days ago they would not let him fill the carbidopa/levodopa  tablets because they needed authorization. He confirmed he did pick this up on 8/15. It turned out that they thought he was trying to  carbidopa/levodopa  tablets.which would have been too soon. He wanted to make sure Dr. Bravo did not cancel or void any prescriptions and that everything is square with the pharmacy for the next time he needs refills. I reviewed the active prescriptions that were sent to the pharmacy on 6/23 and confirmed that both carbidopa/levodopa RXs has refillls

## 2020-08-18 RX ORDER — CARBIDOPA AND LEVODOPA 50; 200 MG/1; MG/1
TABLET, EXTENDED RELEASE ORAL
Qty: 45 TABLET | Refills: 3 | OUTPATIENT
Start: 2020-08-18

## 2020-10-24 ENCOUNTER — COMMUNICATION - HEALTHEAST (OUTPATIENT)
Dept: INTERNAL MEDICINE | Facility: CLINIC | Age: 75
End: 2020-10-24

## 2020-10-24 DIAGNOSIS — K50.118 CROHN'S COLITIS, OTHER COMPLICATION (H): ICD-10-CM

## 2020-10-26 ENCOUNTER — COMMUNICATION - HEALTHEAST (OUTPATIENT)
Dept: INTERNAL MEDICINE | Facility: CLINIC | Age: 75
End: 2020-10-26

## 2020-10-26 DIAGNOSIS — K50.118 CROHN'S COLITIS, OTHER COMPLICATION (H): ICD-10-CM

## 2020-12-13 ENCOUNTER — HEALTH MAINTENANCE LETTER (OUTPATIENT)
Age: 75
End: 2020-12-13

## 2021-01-01 ENCOUNTER — RECORDS - HEALTHEAST (OUTPATIENT)
Dept: ADMINISTRATIVE | Facility: CLINIC | Age: 76
End: 2021-01-01

## 2021-01-01 ENCOUNTER — COMMUNICATION - HEALTHEAST (OUTPATIENT)
Dept: INTERNAL MEDICINE | Facility: CLINIC | Age: 76
End: 2021-01-01

## 2021-01-01 ENCOUNTER — VIRTUAL VISIT (OUTPATIENT)
Dept: NEUROLOGY | Facility: CLINIC | Age: 76
End: 2021-01-01
Payer: MEDICARE

## 2021-01-01 ENCOUNTER — HEALTH MAINTENANCE LETTER (OUTPATIENT)
Age: 76
End: 2021-01-01

## 2021-01-01 ENCOUNTER — VIRTUAL VISIT (OUTPATIENT)
Dept: INTERNAL MEDICINE | Facility: CLINIC | Age: 76
End: 2021-01-01
Payer: MEDICARE

## 2021-01-01 ENCOUNTER — RECORDS - HEALTHEAST (OUTPATIENT)
Dept: INTERNAL MEDICINE | Facility: CLINIC | Age: 76
End: 2021-01-01

## 2021-01-01 ENCOUNTER — MYC MEDICAL ADVICE (OUTPATIENT)
Dept: NEUROLOGY | Facility: CLINIC | Age: 76
End: 2021-01-01

## 2021-01-01 ENCOUNTER — COMMUNICATION - HEALTHEAST (OUTPATIENT)
Dept: SCHEDULING | Facility: CLINIC | Age: 76
End: 2021-01-01

## 2021-01-01 VITALS — BODY MASS INDEX: 25.61 KG/M2 | WEIGHT: 150 LBS | HEIGHT: 64 IN

## 2021-01-01 VITALS — BODY MASS INDEX: 24.99 KG/M2 | WEIGHT: 150 LBS | HEIGHT: 65 IN

## 2021-01-01 VITALS — WEIGHT: 150 LBS | BODY MASS INDEX: 25.61 KG/M2 | HEIGHT: 64 IN

## 2021-01-01 DIAGNOSIS — I10 ESSENTIAL HYPERTENSION: Primary | ICD-10-CM

## 2021-01-01 DIAGNOSIS — G20.A1 PARKINSON DISEASE (H): ICD-10-CM

## 2021-01-01 DIAGNOSIS — I10 ESSENTIAL HYPERTENSION: ICD-10-CM

## 2021-01-01 DIAGNOSIS — K50.118 CROHN'S COLITIS, OTHER COMPLICATION (H): ICD-10-CM

## 2021-01-01 DIAGNOSIS — G20.A1 PARALYSIS AGITANS (H): ICD-10-CM

## 2021-01-01 DIAGNOSIS — G20.A1 PARKINSON'S DISEASE (H): Primary | ICD-10-CM

## 2021-01-01 DIAGNOSIS — G20.A1 PARKINSON'S DISEASE (H): ICD-10-CM

## 2021-01-01 DIAGNOSIS — Z12.31 OTHER SCREENING MAMMOGRAM: ICD-10-CM

## 2021-01-01 DIAGNOSIS — I95.1 ORTHOSTATIC HYPOTENSION: ICD-10-CM

## 2021-01-01 DIAGNOSIS — E78.5 HYPERLIPIDEMIA: ICD-10-CM

## 2021-01-01 PROCEDURE — 99213 OFFICE O/P EST LOW 20 MIN: CPT | Mod: 95 | Performed by: PSYCHIATRY & NEUROLOGY

## 2021-01-01 PROCEDURE — 99442 PR PHYSICIAN TELEPHONE EVALUATION 11-20 MIN: CPT | Mod: 95 | Performed by: INTERNAL MEDICINE

## 2021-01-01 RX ORDER — MIDODRINE HYDROCHLORIDE 2.5 MG/1
TABLET ORAL
Qty: 360 TABLET | Refills: 3 | Status: SHIPPED | OUTPATIENT
Start: 2021-01-01

## 2021-01-01 RX ORDER — SELEGILINE HYDROCHLORIDE 5 MG/1
TABLET ORAL
Qty: 180 TABLET | Refills: 3 | Status: SHIPPED | OUTPATIENT
Start: 2021-01-01

## 2021-01-01 RX ORDER — CARBIDOPA AND LEVODOPA 25; 100 MG/1; MG/1
TABLET ORAL
Qty: 495 TABLET | Refills: 1 | Status: SHIPPED | OUTPATIENT
Start: 2021-01-01

## 2021-01-01 RX ORDER — QUETIAPINE FUMARATE 25 MG/1
TABLET, FILM COATED ORAL
Qty: 270 TABLET | Refills: 3 | Status: SHIPPED | OUTPATIENT
Start: 2021-01-01

## 2021-01-01 RX ORDER — CARBIDOPA AND LEVODOPA 50; 200 MG/1; MG/1
TABLET, EXTENDED RELEASE ORAL
Qty: 45 TABLET | Refills: 3 | Status: SHIPPED | OUTPATIENT
Start: 2021-01-01

## 2021-01-01 RX ORDER — CARBIDOPA AND LEVODOPA 25; 100 MG/1; MG/1
TABLET ORAL
Qty: 495 TABLET | Refills: 1 | Status: SHIPPED | OUTPATIENT
Start: 2021-01-01 | End: 2021-01-01

## 2021-01-01 RX ORDER — SIMVASTATIN 20 MG
TABLET ORAL
Qty: 45 TABLET | Refills: 0 | Status: SHIPPED | OUTPATIENT
Start: 2021-01-01 | End: 2021-01-01

## 2021-01-01 RX ORDER — SIMVASTATIN 20 MG
TABLET ORAL
Qty: 45 TABLET | Refills: 0 | Status: SHIPPED | OUTPATIENT
Start: 2021-01-01

## 2021-01-01 RX ORDER — MERCAPTOPURINE 50 MG/1
TABLET ORAL
Qty: 90 TABLET | Refills: 0 | Status: SHIPPED | OUTPATIENT
Start: 2021-01-01

## 2021-01-01 RX ORDER — DONEPEZIL HYDROCHLORIDE 10 MG/1
TABLET, FILM COATED ORAL
Qty: 90 TABLET | Refills: 11 | Status: SHIPPED | OUTPATIENT
Start: 2021-01-01

## 2021-01-01 RX ORDER — MERCAPTOPURINE 50 MG/1
TABLET ORAL
Qty: 90 TABLET | Refills: 0 | Status: SHIPPED | OUTPATIENT
Start: 2021-01-01 | End: 2021-01-01

## 2021-01-01 RX ORDER — SELEGILINE HYDROCHLORIDE 5 MG/1
TABLET ORAL
Qty: 180 TABLET | Refills: 0
Start: 2021-01-01

## 2021-01-01 RX ORDER — QUETIAPINE FUMARATE 25 MG/1
TABLET, FILM COATED ORAL
Qty: 180 TABLET | Refills: 1 | Status: SHIPPED | OUTPATIENT
Start: 2021-01-01 | End: 2021-01-01

## 2021-01-01 RX ORDER — LANOLIN ALCOHOL/MO/W.PET/CERES
CREAM (GRAM) TOPICAL
Qty: 90 TABLET | Refills: 3 | COMMUNITY
Start: 2021-01-01

## 2021-05-25 NOTE — TELEPHONE ENCOUNTER
Rx Authorization:    Requested Medication/ Dose: Carbidop-Levodopa Oral Tab     Date last refill ordered: 6/23/20    Quantity ordered: 495tabs    # refills:     Date of last clinic visit with ordering provider: 6/23/20    Date of next clinic visit with ordering provider: 6/25/21    All pertinent protocol data (lab date/result):     Include pertinent information from patients message:

## 2021-05-28 NOTE — TELEPHONE ENCOUNTER
RN cannot approve Refill Request    RN can NOT refill this medication med is not covered by policy/route to provider. Last office visit: 8/21/2018 Faizan Villagomez MD Last Physical: 12/19/2017 Last MTM visit: Visit date not found Last visit same specialty: 8/21/2018 Faizan Villagomez MD.  Next visit within 3 mo: Visit date not found  Next physical within 3 mo: Visit date not found      Bridgette Pereira, [facility] Triage/Med Refill 5/4/2019    Requested Prescriptions   Pending Prescriptions Disp Refills     mercaptopurine (PURINETHOL) 50 mg tablet [Pharmacy Med Name: Mercaptopurine Oral Tablet 50 MG] 90 tablet 0     Sig: TAKE ONE TABLET BY MOUTH ONE TIME DAILY       There is no refill protocol information for this order

## 2021-05-30 NOTE — TELEPHONE ENCOUNTER
Provider Communication  Who is calling:  Dr Bravo   Facility in which provider is associated:  [facility] - Neurology  Reason for call:  Re: this shared patient, patient is now home bound -  has expressed he is no longer able to get her to appointments at this [place] location. Dr Bravo is inquiring if you are be able to assume all Medication Management for this patient. Or if there is possibility of In home service for the patient.  Urgency for return call:  as available  Okay to leave detailed message?:  Yes

## 2021-05-30 NOTE — TELEPHONE ENCOUNTER
I am happy to assume all medical care and medicine writing for the patient.  I spoke with patient's  also patient of myself yesterday about this and we are agreement in our thinking on this.  Many thanks to Dr. PETER for all he has done for the patient.  I will call up on him if question should arise.  Many thanks again.  Please call Dr. PETER for me.  At the [facility] Department of neurology

## 2021-05-31 NOTE — TELEPHONE ENCOUNTER
RN cannot approve Refill Request    RN can NOT refill this medication med is not covered by policy/route to provider. Last office visit: 8/21/2018 Faizan Villagomez MD Last Physical: 12/19/2017 Last MTM visit: Visit date not found Last visit same specialty: 8/21/2018 Faizna Villagomez MD.  Next visit within 3 mo: Visit date not found  Next physical within 3 mo: Visit date not found      Radha Reid, [facility] Triage/Med Refill 8/1/2019    Requested Prescriptions   Pending Prescriptions Disp Refills     mercaptopurine (PURINETHOL) 50 mg tablet [Pharmacy Med Name: Mercaptopurine Oral Tablet 50 MG] 90 tablet 0     Sig: TAKE ONE TABLET BY MOUTH ONE TIME DAILY       There is no refill protocol information for this order

## 2021-06-01 NOTE — TELEPHONE ENCOUNTER
RN cannot approve Refill Request    RN can NOT refill this medication PCP messaged that patient is overdue for Office Visit. Last office visit: 8/21/2018 Faizan Villagomez MD Last Physical: 12/19/2017 Last MTM visit: Visit date not found Last visit same specialty: 8/21/2018 Faizan Villagomez MD.  Next visit within 3 mo: Visit date not found  Next physical within 3 mo: Visit date not found      Bridgette Pereira, [facility] Triage/Med Refill 9/15/2019    Requested Prescriptions   Pending Prescriptions Disp Refills     simvastatin (ZOCOR) 20 MG tablet [Pharmacy Med Name: Simvastatin Oral Tablet 20 MG] 45 tablet 1     Sig: TAKE 1/2 TABLET BY MOUTH EVERY DAY       Statins Refill Protocol (Hmg CoA Reductase Inhibitors) Failed - 9/15/2019  7:01 AM        Failed - PCP or prescribing provider visit in past 12 months      Last office visit with prescriber/PCP: 8/21/2018 Faizan Villagomez MD OR same dept: Visit date not found OR same specialty: 8/21/2018 Faizan Villagomez MD  Last physical: 12/19/2017 Last MTM visit: Visit date not found   Next visit within 3 mo: Visit date not found  Next physical within 3 mo: Visit date not found  Prescriber OR PCP: Faizan Villagomez MD  Last diagnosis associated with med order: 1. Hyperlipidemia  - simvastatin (ZOCOR) 20 MG tablet [Pharmacy Med Name: Simvastatin Oral Tablet 20 MG]; TAKE 1/2 TABLET BY MOUTH EVERY DAY  Dispense: 45 tablet; Refill: 1    If protocol passes may refill for 12 months if within 3 months of last provider visit (or a total of 15 months).

## 2021-06-02 NOTE — TELEPHONE ENCOUNTER
RN cannot approve Refill Request    RN can NOT refill this medication med is not covered by policy/route to provider. Last office visit: 8/21/2018 Faizan Villagomez MD Last Physical: 12/19/2017 Last MTM visit: Visit date not found Last visit same specialty: 8/21/2018 Faizan Villagomez MD.  Next visit within 3 mo: Visit date not found  Next physical within 3 mo: Visit date not found      Danii Balbuena, [facility] Triage/Med Refill 10/29/2019    Requested Prescriptions   Pending Prescriptions Disp Refills     mercaptopurine (PURINETHOL) 50 mg tablet [Pharmacy Med Name: Mercaptopurine Oral Tablet 50 MG] 90 tablet 0     Sig: TAKE ONE TABLET BY MOUTH ONE TIME DAILY       There is no refill protocol information for this order

## 2021-06-05 NOTE — TELEPHONE ENCOUNTER
RN cannot approve Refill Request    RN can NOT refill this medication med is not covered by policy/route to provider. Last office visit: 8/21/2018 Faizan Villagomez MD Last Physical: 12/19/2017 Last MTM visit: Visit date not found Last visit same specialty: 8/21/2018 Faizan Villagomez MD.  Next visit within 3 mo: Visit date not found  Next physical within 3 mo: Visit date not found      Aggie Pina, [facility] Triage/Med Refill 1/29/2020    Requested Prescriptions   Pending Prescriptions Disp Refills     mercaptopurine (PURINETHOL) 50 mg tablet [Pharmacy Med Name: Mercaptopurine Oral Tablet 50 MG] 90 tablet 0     Sig: TAKE ONE TABLET BY MOUTH ONE TIME DAILY       There is no refill protocol information for this order

## 2021-06-07 NOTE — TELEPHONE ENCOUNTER
RN cannot approve Refill Request    RN can NOT refill this medication med is not covered by policy/route to provider. Last office visit: 8/21/2018 Faizan Villagomez MD Last Physical: 12/19/2017 Last MTM visit: Visit date not found Last visit same specialty: 8/21/2018 Faizan Villagomez MD.  Next visit within 3 mo: Visit date not found  Next physical within 3 mo: Visit date not found      Aggie Pina, [facility] Triage/Med Refill 4/26/2020    Requested Prescriptions   Pending Prescriptions Disp Refills     mercaptopurine (PURINETHOL) 50 mg tablet [Pharmacy Med Name: Mercaptopurine Oral Tablet 50 MG] 90 tablet 0     Sig: TAKE ONE TABLET BY MOUTH ONE TIME DAILY       There is no refill protocol information for this order

## 2021-06-07 NOTE — PROGRESS NOTES
VIDEO VISIT    Date of Visit: June 25, 2021  Name: Jerica Blas  Date of Birth 1945  [place] [address]  [phone] (H)  [email or web address]  Has mychart     No proxy  Lily Poe daughter  Reinaldo Najera   [phone]      Assessment:  (G20) Parkinson's disease (H)  (primary encounter diagnosis)  Carbidopa/levodopa Sinemet CR 50/200  Carbidopa/levodopa Sinemet   Selegiline eldepryl 5mg    Gait/Med related complications/Falls     Exercise/Therapy     Cognitive/Driving  Donepezil aricept 10mg    Mood     Hallucinations/delusions  Quetiapine seroquel 25mg    Sleep  Melatonin 3mg    Bladder     GI/Constipation/GERD     ENDO  Calcium carb-cholecalciferol calcium 500 + D 500+400 mg-unit  Simvastatin zocor 20mg  Vitamin D3 2000 units 50 mcg    Cardio/heart  Midodrine proamatine 2.5mg 3/day  Has not fainted for quit a while      Vision     Heme     Other:    Got a shot - vaccine    Mercaptopurine purinethol 50mg tablet chemo    Medications     1030am 230/3pm 630/7pm 10pm 1230am   Calcium carb-cholecalciferol calcium 500 + D 500+400 mg-unit Cutting up so easier to swallow           Carbidopa/levodopa Sinemet CR 50/200         1/2   Carbidopa/levodopa Sinemet  1.5 1.5 1.5 1     Donepezil aricept 10mg       1     Melatonin 3mg       1or 2      Mercaptopurine purinethol 50mg tablet chemo 1           Midodrine proamatine 2.5mg 1 1 1       Quetiapine seroquel 25mg   1     1   Selegiline eldepryl 5mg 1 1         Simvastatin zocor 20mg         1/2   Vitamin D3 2000 units 50 mcg 1           Plan:    No change in medication regimen  Refills made    Medical Decision Making:  #  Chronic progressive medical conditions addressed  bp and parkinsonism  Review and/or interpretation of unique test or documentation from a provider outside of neurology no   Independent historian provided additional details  yes   Prescription drug management and review of potential side effects and/or monitoring for side  "effects  yes   Health impacted by social determinants of health  Yes - home bound    I have reviewed the note as documented above.  This accurately captures the substance of my conversation with the patient and total time spent preparing for visit, executing visit and completing visit on the day of the visit:  20 minutes.     Start phone phone call 330pm  End of phone call 350pm     Ray Bravo MD      ------------------------------------------------------------------------------------------------------------------------------------------------------------------------    Video-Visit Details    The patient has been notified of following:     \"After a review of the patient s situation, this visit was changed from an in-person visit to a video visit to reduce the risk of COVID-19 exposure.   The patient is being evaluated via a billable video visit.\"    \"This video visit will be conducted via a call between you and your physician/provider. We have found that certain health care needs can be provided without the need for an in-person physical exam.  This service lets us provide the care you need with a video conversation.  If a prescription is necessary we can send it directly to your pharmacy.  If lab work is needed we can place an order for that and you can then stop by our lab to have the test done at a later time.    If during the course of the call the physician/provider feels a video visit is not appropriate, you will not be charged for this service.\"     Patient has given verbal consent for Video visit? Yes    Patient would like the video invitation sent by:     Type of service:  Video Visit    Video Start Time:     Video End Time (time video stopped):     Duration:  minutes - see above    Originating Location (pt. Location):     Distant Location (provider location):  [facility]     Mode of Communication:  Video Conference via Kanmu (and if not possible then " doximity)      Ray Bravo MD      --------------------------------------------------------------------------------------------------------------    Jerica Blas is a 75 year old female who is being evaluated via a billable video visit.      Charts reviewed  Consult from  Images reviewed        I have reviewed and updated the patient's Past Medical History, Social History, Family History and Medication List.    ALLERGIES  Citalopram, Sertraline, Meperidine, No clinical screening - see comments, Demerol, Hay fever & [a.r.m.], Mirapex [pramipexole], Namenda [memantine hydrochloride], Requip [ropinirole hydrochloride], Ropinirole, Memantine, and Neomycin    Lasts visit details if there was a last visit:       14 Review of systems  are negative except for   Patient Active Problem List   Diagnosis     Parkinsonism (H)     Dementia (H)     Crohn disease (H)     Fistula     Nocturia     REM sleep behavior disorder     Vitamin D deficiency     Wears glasses     Glaucoma     Hypercholesteremia     Constipation     Finger fracture     Foot fracture     Confusion     Bunion     Cracked skin     Hand pain     Macrocytosis     Chronic renal disease     Chronic kidney disease, stage III (moderate)     Regional enteritis (H)     Lewy body dementia (H)     Parkinsonian syndrome associated with symptomatic orthostatic hypotension (H)     Syncope        Allergies   Allergen Reactions     Citalopram Other (See Comments)     Fainting     Sertraline Other (See Comments)     Fainting     Meperidine Unknown     Other reaction(s): Other (See Comments)  Confusion.     No Clinical Screening - See Comments Rash     Itchy eyes.     Demerol      Hay Fever & [A.R.M.]      Mirapex [Pramipexole] Other (See Comments)     Hallucinations     Namenda [Memantine Hydrochloride]      No benefit       Requip [Ropinirole Hydrochloride]      ? confusion     Ropinirole Unknown     Memantine Rash     Neomycin Rash     Past Surgical History:   Procedure  Laterality Date     BIOPSY OF SKIN LESION  october 2014    seborrheic keratosis of left face     EYE SURGERY  jan 2012    left cataract     EYE SURGERY  march 2012    right cataract     fistula repair?       Past Medical History:   Diagnosis Date     Bunion 3/27/2012     Chronic renal disease 9/24/2013     Confusion 9/6/2011     Constipation     1/week     Constipation 9/6/2011     Cracked skin 3/27/2012     Crohn disease (H) 9/2/2011     Crohn's     mercaptopurine     Disorientation     denies hallucinations. Has memory problems     Entero-colonic fistula 2000    related to crohns; [facility]; had a prior one      Falls     once every 3 months     Finger fracture 9/6/2011     Finger fracture, right      Fistula 9/2/2011     Foot fracture      Freezing      Glaucoma     uses travatan and timolol     Glaucoma 9/6/2011     Hand pain 3/27/2012     Hypercholesteremia 9/6/2011     Hypercholesterolaemia     on simvastatin     Macrocytosis 9/24/2013     Nocturia     1-3/noc     Over weight     with pd has gained 55 lbs     Parkinson's disease 1998    left side onset; left handed. dyskinesias     REM sleep behavior disorder     not frequent     Screening colonoscopy 2011     Vitamin D deficiency      Wears glasses      Social History     Socioeconomic History     Marital status:      Spouse name: Not on file     Number of children: Not on file     Years of education: Not on file     Highest education level: Not on file   Occupational History     Not on file   Social Needs     Financial resource strain: Not on file     Food insecurity     Worry: Not on file     Inability: Not on file     Transportation needs     Medical: Not on file     Non-medical: Not on file   Tobacco Use     Smoking status: Never Smoker     Smokeless tobacco: Never Used   Substance and Sexual Activity     Alcohol use: No     Drug use: No     Sexual activity: Not Currently   Lifestyle     Physical activity     Days per week: Not on file     Minutes  per session: Not on file     Stress: Not on file   Relationships     Social connections     Talks on phone: Not on file     Gets together: Not on file     Attends Anabaptist service: Not on file     Active member of club or organization: Not on file     Attends meetings of clubs or organizations: Not on file     Relationship status: Not on file     Intimate partner violence     Fear of current or ex partner: Not on file     Emotionally abused: Not on file     Physically abused: Not on file     Forced sexual activity: Not on file   Other Topics Concern     Parent/sibling w/ CABG, MI or angioplasty before 65F 55M? No   Social History Narrative    Living in [place]    Not exercising    No tobacco    No alcohol        FAMILY HISTORY:  Her father  in his 70s from alcoholism.  Her mother is alive and age 94.  She has two sisters and one brother who are healthy.  She has two daughters who are 33 and 35.  She is of Arabic-Sammarinese background.        SOCIAL HISTORY:  She was born in [place] and grew up in the [place] and then [place].  She came to [place] after college.  She has been  since , i.e., 39 years.  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     Family History   Problem Relation Age of Onset     Heart Disease Mother         94 and alive     Cerebrovascular Disease Mother      Alcohol/Drug Father         70s  in 80s; [place] ; Arabic; [place]     Current Outpatient Medications   Medication Sig Dispense Refill     Calcium Carb-Cholecalciferol (CALCIUM 500 +D) 500-400 MG-UNIT TABS Cutting into 1/2 tabs and taking both 1/2 tabs @ 1030am = 1 tab per day       carbidopa-levodopa (SINEMET CR)  MG CR tablet 1/2 TABLET BY MOUTH@ 1230am 45 tablet 3     carbidopa-levodopa (SINEMET)  MG tablet take 1 &1/2 TABLETS by mouth AT 10:30AM, 2:30-3PM, 6:30-7PM AND 1 TABLET AT 10PM 495 tablet 1     donepezil (ARICEPT) 10 MG tablet 10mg tab by  mouth nightly @ 10pm 90 tablet 11     melatonin 3 MG tablet 3mg tab by mouth nightly @ 10pm 90 tablet 3     mercaptopurine (PURINETHOL) 50 MG tablet CHEMO 50mg tab by mouth daily @ 1030am 90 tablet 3     midodrine (PROAMATINE) 2.5 MG tablet 2.5mg tab by mouth 3/day @ 1030am, 230/3pm and 630/7pm and extra 2.5mg tab by mouth daily as needed = 4/day 360 tablet 3     QUEtiapine (SEROQUEL) 25 MG tablet Take 1 tablet (25 mg) by mouth at 230-3pm and 1 tablet by mouth at 1230am 180 tablet 1     selegiline 5 MG tablet take 1 tablet by mouth twice daily @ 10:30am and 2:30-3pm 180 tablet 3     simvastatin (ZOCOR) 20 MG tablet 1/2 x 20mgtab @ 1230am 90 tablet 3     vitamin D3 (CHOLECALCIFEROL) 2000 units (50 mcg) tablet 2000 unit tab by mouth daily @ 1030am           Medications

## 2021-06-10 NOTE — TELEPHONE ENCOUNTER
RN cannot approve Refill Request    RN can NOT refill this medication med is not covered by policy/route to provider     . Last office visit: 8/21/2018 Faizan Villagomez MD Last Physical: 12/19/2017 Last MTM visit: Visit date not found Last visit same specialty: 8/21/2018 Faizan Villagomez MD.  Next visit within 3 mo: Visit date not found  Next physical within 3 mo: Visit date not found      Perlita Mckeon, [facility] Triage/Med Refill 7/29/2020    Requested Prescriptions   Pending Prescriptions Disp Refills     mercaptopurine (PURINETHOL) 50 mg tablet [Pharmacy Med Name: Mercaptopurine Oral Tablet 50 MG] 90 tablet 0     Sig: TAKE ONE TABLET BY MOUTH ONE TIME DAILY       There is no refill protocol information for this order

## 2021-06-11 NOTE — PROGRESS NOTES
Office Visit - Follow up    Jerica Dill   71 y.o. female    Date of Visit: 6/5/2017    Chief Complaint   Patient presents with     Tremors     Parkinson     Mass     check lump basse of neck       Subjective: Parkinsonian syndrome with Lewy body dementia and Shy-Drager orthostatic hypertension.    Lump on base of neck left side small no more than three quarters of an inch and elliptical diameter appears to be a sebaceous cyst easily mobile not hot not red.    The patient has not had any more fainting spells she is quit Mirapex tablets for side effects hallucinations.  Other meds were reviewed generally well-tolerated history of hyperlipidemia as well no history of MI or stroke covered by her  Uday who is very supportive.    No blood in stool or urine no blood in sputum no chest pain or shortness of breath.    Medication list reviewed well-tolerated.  Originally seen here in [place] by Dr. Cornelio George from [facility] neurology he referred her onto the [facility] and [facility] Dr. Ozzy tipton.    ROS: A comprehensive review of systems was performed and was otherwise negative    Medications:  Prior to Admission medications    Medication Sig Start Date End Date Taking? Authorizing Provider   calcium-vitamin D (CALCIUM-VITAMIN D) 500 mg(1,250mg) -200 unit per tablet Take 1 tablet by mouth daily.   Yes PROVIDER, HISTORICAL   carbidopa-levodopa (SINEMET CR)  mg per tablet Take 0.5 tablets by mouth bedtime.    Yes PROVIDER, HISTORICAL   carbidopa-levodopa (SINEMET)  mg per tablet Take 1.5 tablets by mouth 4 (four) times a day.    Yes Faizan Villagomez MD   cholecalciferol, vitamin D3, 1,000 unit tablet Take 3,000 Units by mouth daily.    Yes PROVIDER, HISTORICAL   donepezil (ARICEPT) 10 MG tablet Take 10 mg by mouth daily.   Yes Faizan Villagomez MD   melatonin 3 mg Tab tablet Take 3 mg by mouth bedtime.   Yes PROVIDER, HISTORICAL   mercaptopurine (PURINETHOL) 50 mg tablet  TAKE 1 TABLET BY MOUTH DAILY. 8/25/16  Yes Faizan Villagomez MD   QUEtiapine (SEROQUEL) 25 MG tablet Take 25 mg by mouth bedtime. Take 0.5 at 11:00 and one tablet at bedtime   Yes Faizan Villagomez MD   selegiline (ELDEPRYL) 5 mg tablet Take 5 mg by mouth 2 (two) times a day with meals.   Yes Faizan Villagomez MD   simvastatin (ZOCOR) 20 MG tablet TAKE 1/2 TABLET BY MOUTH EVERY DAY 4/3/17  Yes Faizan Villagomez MD   urea (CARMOL) 40 % Crea Apply topically daily.   Yes PROVIDER, HISTORICAL   midodrine (PROAMATINE) 2.5 MG tablet TAKE 1 TABLET (2.5 MG TOTAL) BY MOUTH 3 (THREE) TIMES A DAY. 5/22/17   Almas Peoples MD   pramipexole (MIRAPEX) 1 MG tablet Take 0.5 mg by mouth 3 (three) times a day.  6/5/17  Faizan Villagomez MD       Allergies:   Allergies   Allergen Reactions     Grass Pollen Rash     Itchy eyes.     Meperidine Other (See Comments)     Confusion.     Memantine Rash     Neomycin Rash       Immunizations:   Immunization History   Administered Date(s) Administered     Influenza high dose, seasonal 12/07/2015, 10/03/2016     Influenza, inj, historic 10/23/2008, 09/30/2011     Influenza, seasonal,quad inj 6-35 mos 10/29/2012, 11/07/2013, 10/08/2014     Pneumo Conj 13-V (2010&after) 12/07/2015     Pneumo Polysac 23-V 09/30/2011     Tdap 06/01/2010       Exam Chest clear to auscultation and percussion.  Heart tones regular rhythm without murmur rub or gallop.  Abdomen soft nontender no organomegaly.  No peritoneal signs.  Extremities free of edema cyanosis or clubbing.  Neck veins nondistended no thyromegaly or scleral icterus noted, carotids full.  Skin warm and dry easily conversant good spirited.  Normal intelligence.  Neurologically intact no gross localizing findings.  Wheelchair-bound mass bases slow in movement some cogwheel rigidity bilaterally no obvious resting tremor trace edema noted lower extremities.    Assessment and Plan  Parkinsonian syndrome with Lewy body dementia and Shy-Drager  syndrome with orthostatic hypotension.  Same meds and cares.    Multiple drug allergies including meperidine memantine neomycin.  Plus grass pollen allergy.    History of Crohn's colitis currently quiescent.    Chronic kidney disease stage III-IV.    Syncopal spells may be related to orthostatic hypotension discussed.  Currently asymptomatic for the last 4 months blood pressure 102/62 pulse 78 regular.  RTC 2 months time.  Check lipid panel today linked to parkinsonian syndrome with Lewy body dementia and Shy-Drager syndrome orthostatic hypotension.    Time: total time spent with the patient was 25 minutes of which >50% was spent in counseling and coordination of care    The following high BMI interventions were performed this visit: encouragement to exercise    Faizan Villagomez MD    Patient Active Problem List   Diagnosis     Parkinsonian syndrome with dementia and symptomatic orthostatic hypotension     Crohn's (Granulomatous) Colitis     Lewy body dementia     Chronic kidney disease, stage III (moderate)     Syncope

## 2021-06-12 NOTE — TELEPHONE ENCOUNTER
RN cannot approve Refill Request    RN can NOT refill this medication med is not covered by policy/route to provider. Last office visit: 8/21/2018 Faizan Villagomez MD Last Physical: 12/19/2017 Last MTM visit: Visit date not found Last visit same specialty: 8/21/2018 Faizan Villagomez MD.  Next visit within 3 mo: Visit date not found  Next physical within 3 mo: Visit date not found      Kiesha Hardy, [facility] Triage/Med Refill 10/26/2020    Requested Prescriptions   Pending Prescriptions Disp Refills     mercaptopurine (PURINETHOL) 50 mg tablet [Pharmacy Med Name: Mercaptopurine Oral Tablet 50 MG] 90 tablet 0     Sig: TAKE ONE TABLET BY MOUTH ONE TIME DAILY       There is no refill protocol information for this order

## 2021-06-12 NOTE — TELEPHONE ENCOUNTER
RN cannot approve Refill Request    RN can NOT refill this medication Protocol failed and NO refill given. Last office visit: 8/21/2018 Faizan Villagomez MD Last Physical: 12/19/2017 Last MTM visit: Visit date not found Last visit same specialty: 8/21/2018 Faizan Villagomez MD.  Next visit within 3 mo: Visit date not found  Next physical within 3 mo: Visit date not found      Bridgette Pereira, [facility] Triage/Med Refill 10/24/2020    Requested Prescriptions   Pending Prescriptions Disp Refills     mercaptopurine (PURINETHOL) 50 mg tablet [Pharmacy Med Name: Mercaptopurine Oral Tablet 50 MG] 90 tablet 0     Sig: TAKE ONE TABLET BY MOUTH ONE TIME DAILY       There is no refill protocol information for this order

## 2021-06-14 NOTE — TELEPHONE ENCOUNTER
RN cannot approve Refill Request    RN can NOT refill this medication Protocol failed and NO refill given. Last office visit: 8/21/2018 Faizan Villagomez MD Last Physical: 12/19/2017 Last MTM visit: Visit date not found Last visit same specialty: 8/21/2018 Faizan Villagomez MD.  Next visit within 3 mo: Visit date not found  Next physical within 3 mo: Visit date not found      Bridgette Pereira, [facility] Triage/Med Refill 1/24/2021    Requested Prescriptions   Pending Prescriptions Disp Refills     mercaptopurine (PURINETHOL) 50 mg tablet [Pharmacy Med Name: Mercaptopurine Oral Tablet 50 MG] 90 tablet 0     Sig: Take 1 tablet by mouth daily.       There is no refill protocol information for this order

## 2021-06-14 NOTE — PROGRESS NOTES
Office Visit - Follow up    Jerica Dill   72 y.o. female    Date of Visit: 2017    Chief Complaint   Patient presents with     Tremors     Parkinsonian Syndrome       Subjective: Extended office visit for examination.  Seen by [facility] neurology group Dr. Bell to it 2017 for Parkinson's disease with Shy-Drager component and orthostatic hypotension.  She is 72 years of age.  Mammogram done 2017.  Colonoscopy done 2014 showed a sessile serrated adenomatous polyp no active Crohn's this was done by [facility].    Extended office visit the patient is unable to do or answer any other questions for annual wellness visit the patient has severe Parkinson's disease with hallucinations and side Draeger component with Lewy body dementia.  Appetite is improved hydration status is good when she tried taking an antidepressant for sundowners syndrome under the direction of [facility] neurologist she developed syncopal spells.  The patient is a non-smoker she does not use alcohol to excess.    Allergies include Namenda plus meperidine or Demerol and neomycin.  The patient previously had had Crohn's disease involving the perianal area and colon but latest colonoscopy showed no active disease.  Parkinson's disease with Shy-Drager component and Lewy body dementia.    Perianal component improved with mercaptopurine therapy.  History of hyperlipidemia but no history of myocardial infarction or stroke surgery surgical history includes fissure and anal surgery for Crohn's.    Mother  97.    Father  age 87 he was in [place] [employer] working in [place] for the [employer] and had used alcohol to excess.    2 daughters well 5 grandchildren well;  is very supportive.  And is her primary caregiver.  She is wheelchair bound.    ROS: A comprehensive review of systems was performed and was otherwise negative    Medications:  Prior to Admission medications     Medication Sig Start Date End Date Taking? Authorizing Provider   calcium-vitamin D (CALCIUM-VITAMIN D) 500 mg(1,250mg) -200 unit per tablet Take 1 tablet by mouth daily.   Yes PROVIDER, HISTORICAL   carbidopa-levodopa (SINEMET CR)  mg per tablet Take 0.5 tablets by mouth bedtime.    Yes PROVIDER, HISTORICAL   carbidopa-levodopa (SINEMET)  mg per tablet Take 1.5 tablets by mouth 4 (four) times a day.    Yes Faizan Villagomez MD   cholecalciferol, vitamin D3, 1,000 unit tablet Take 3,000 Units by mouth daily.    Yes PROVIDER, HISTORICAL   donepezil (ARICEPT) 10 MG tablet Take 10 mg by mouth daily.   Yes Faizan Villagomez MD   melatonin 3 mg Tab tablet Take 3 mg by mouth bedtime.   Yes PROVIDER, HISTORICAL   mercaptopurine (PURINETHOL) 50 mg tablet TAKE ONE TABLET BY MOUTH ONE TIME DAILY  8/20/17  Yes Faizan Villagomez MD   midodrine (PROAMATINE) 2.5 MG tablet TAKE ONE TABLET BY MOUTH THREE TIMES DAILY 11/28/17  Yes Faizan Villagomez MD   QUEtiapine (SEROQUEL) 25 MG tablet Take 25 mg by mouth bedtime. Take 0.5 at 11:00 and one tablet at bedtime   Yes Faizan Villagomez MD   selegiline (ELDEPRYL) 5 mg tablet Take 5 mg by mouth 2 (two) times a day with meals.   Yes Faizan Villagomez MD   simvastatin (ZOCOR) 20 MG tablet TAKE 1/2 TABLET BY MOUTH EVERY DAY 4/3/17  Yes Faizan Villagomez MD   urea (CARMOL) 40 % Crea Apply topically daily.   Yes PROVIDER, HISTORICAL       Allergies:   Allergies   Allergen Reactions     Grass Pollen Rash     Itchy eyes.     Meperidine Other (See Comments)     Confusion.     Lexapro [Escitalopram]      Lowered blood pressure     Memantine Rash     Pramipexole      Lowered blood pressure     Sertraline      Lowered blood pressusre     Neomycin Rash       Immunizations:   Immunization History   Administered Date(s) Administered     Influenza high dose, seasonal 12/07/2015, 10/03/2016     Influenza, inj, historic,unspecified 10/23/2008, 09/30/2011     Influenza,  seasonal,quad inj 6-35 mos 10/29/2012, 11/07/2013, 10/08/2014     Pneumo Conj 13-V (2010&after) 12/07/2015     Pneumo Polysac 23-V 09/30/2011     Tdap 06/01/2010       Exam Chest clear to auscultation and percussion.  Heart tones regular rhythm without murmur rub or gallop.  Abdomen soft nontender no organomegaly.  No peritoneal signs.  Extremities free of edema cyanosis or clubbing.  Neck veins nondistended no thyromegaly or scleral icterus noted, carotids full.  Skin warm and dry easily conversant good spirited.  Normal intelligence.  Neurologically intact no gross localizing findings.      Assessment and Plan  Parkinson's disease for Shy-Drager component orthostatic hypotension and Lewy body dementia.    Patient has had a history of Crohn's as well with Crohn's with severe perianal involvement better after mercapto purine therapy latest colonoscopy showed no active Crohn's disease date of colonoscopy March 28, 2014 mammogram allCLEAR August 7, 2017 in addition today patient will have comprehensive laboratory tests including hemogram conference of metabolic profile urinalysis lipid panel and TSH.    Previously the patient has been assessed for vitamin D3 level and vitamin B12 level 1 year ago and unremarkable.  Chronic kidney disease stable previously noted.  Will be reassessed today with conference of metabolic profile and hemogram.    Time: total time spent with the patient was 40 minutes of which >50% was spent in counseling and coordination of care    The following high BMI interventions were performed this visit: encouragement to exercise    Faizan Villagomez MD    Patient Active Problem List   Diagnosis     Parkinsonian syndrome with dementia and symptomatic orthostatic hypotension     Crohn's (Granulomatous) Colitis     Lewy body dementia     Chronic kidney disease, stage III (moderate)     Syncope

## 2021-06-14 NOTE — TELEPHONE ENCOUNTER
RN cannot approve Refill Request    RN can NOT refill this medication med is not covered by policy/route to provider. Last office visit: 8/21/2018 Faizan Villagomez MD Last Physical: 12/19/2017 Last MTM visit: Visit date not found Last visit same specialty: 8/21/2018 Faizan Villagomez MD.  Next visit within 3 mo: Visit date not found  Next physical within 3 mo: Visit date not found      Perlita Mckeon, [facility] Triage/Med Refill 1/22/2021    Requested Prescriptions   Pending Prescriptions Disp Refills     mercaptopurine (PURINETHOL) 50 mg tablet [Pharmacy Med Name: Mercaptopurine Oral Tablet 50 MG] 90 tablet 0     Sig: Take 1 tablet by mouth daily.       There is no refill protocol information for this order

## 2021-06-15 NOTE — TELEPHONE ENCOUNTER
Spouse states patient needs Covid vaccine and asking if there is any way to have someone come to home to administer.  Patient also has not received flu vaccine due to inability to leave home.      Fabby Tineo RN  [facility] Triage Nurse Advisor    Please close encounter when completed.

## 2021-06-15 NOTE — TELEPHONE ENCOUNTER
RN cannot approve Refill Request    RN can NOT refill this medication Protocol failed and NO refill given. Last office visit: 8/21/2018 Faizan Villagomez MD Last Physical: 12/19/2017 Last MTM visit: Visit date not found Last visit same specialty: 8/21/2018 Faizan Villagomez MD.  Next visit within 3 mo: Visit date not found  Next physical within 3 mo: Visit date not found      Clark Clifford, [facility] Connection Triage/Med Refill 3/12/2021    Requested Prescriptions   Pending Prescriptions Disp Refills     simvastatin (ZOCOR) 20 MG tablet [Pharmacy Med Name: Simvastatin Oral Tablet 20 MG] 45 tablet 0     Sig: TAKE 1/2 TABLET BY MOUTH EVERY DAY       Statins Refill Protocol (Hmg CoA Reductase Inhibitors) Failed - 3/12/2021  2:00 AM        Failed - PCP or prescribing provider visit in past 12 months      Last office visit with prescriber/PCP: 8/21/2018 Faizan Villagomez MD OR same dept: Visit date not found OR same specialty: 8/21/2018 Faizan Villagomez MD  Last physical: 12/19/2017 Last MTM visit: Visit date not found   Next visit within 3 mo: Visit date not found  Next physical within 3 mo: Visit date not found  Prescriber OR PCP: Faizan Villagomez MD  Last diagnosis associated with med order: 1. Hyperlipidemia  - simvastatin (ZOCOR) 20 MG tablet [Pharmacy Med Name: Simvastatin Oral Tablet 20 MG]; TAKE 1/2 TABLET BY MOUTH EVERY DAY  Dispense: 45 tablet; Refill: 0    If protocol passes may refill for 12 months if within 3 months of last provider visit (or a total of 15 months).

## 2021-06-16 NOTE — TELEPHONE ENCOUNTER
RN cannot approve Refill Request    RN can NOT refill this medication med is not covered by policy/route to provider. Last office visit: 8/21/2018 Faizan Villagomez MD Last Physical: 12/19/2017 Last MTM visit: Visit date not found Last visit same specialty: 8/21/2018 Faizan Villagomez MD.  Next visit within 3 mo: Visit date not found  Next physical within 3 mo: Visit date not found      Aggie Pina, [facility] Triage/Med Refill 4/21/2021    Requested Prescriptions   Pending Prescriptions Disp Refills     mercaptopurine (PURINETHOL) 50 mg tablet [Pharmacy Med Name: Mercaptopurine Oral Tablet 50 MG] 90 tablet 0     Sig: TAKE ONE TABLET BY MOUTH ONE TIME DAILY       There is no refill protocol information for this order

## 2021-06-17 NOTE — TELEPHONE ENCOUNTER
Telephone Encounter by Francoise Parry CMA at 2/18/2021 10:51 AM     Author: Francoise Parry CMA Service: -- Author Type: Certified Medical Assistant    Filed: 2/18/2021 11:07 AM Encounter Date: 2/18/2021 Status: Signed    : Francoise Parry CMA (Certified Medical Assistant)       Hi, again thanks for scheduling my covid vaccines.  I didnt want to over ask , so I asked [facility] information if they knew of anyone doing inhome vaccines for homebound patients ( my wife Jerica).  My inlrohith [place] and are homebound and did get their vaccines at home.  Medicare and BCBS said they would pay for it if I could find a provider.  [facility] said [facility] team would check on it and also inform this office of the question.  We havent heard back yet and am wondering if you have any information regarding inhome vaccines?  Thanks!  Uday Frye is also wondering - with all of Jerica's conditions is it a good idea to get the vaccine ?    Faizan Villagomez MD  to Uzma Paez CMA         2/12/21 12:09 PM  Note     Please check on this and it's ok for pt to receive the covid and flu vaccines now.

## 2021-06-20 NOTE — PROGRESS NOTES
Office Visit - Follow up    Jerica Dill   72 y.o. female    Date of Visit: 8/21/2018    Chief Complaint   Patient presents with     Tremors     Parkinson's     Chronic Kidney Disease       Subjective: Lewy body dementia with Parkinson syndrome.  Multiple allergies including grass pollen meperidine Lexapro Namenda sertraline neomycin and pramipexole    Accompanied by her  the history is very difficult because of Lewy body dementia her speech is basically unintelligible.  It is better in the early morning after she is awakened from a good night's rest.  This is an afternoon appointment where she is missing her nap.    No blood in stool or urine on direct questioning no chest pain or shortness of breath no leg edema she tries to avoid salt in the diet.  Her  who is very attentive to her Reinaldo tries to walk for 30 feet or 30 steps each day.  She is basically otherwise wheelchair-bound.    Her speech and language function has worsened and she has fainting spells consistent with the Shy-Drager syndrome.  Long history of Parkinson's disease.    Medication list reviewed generally well-tolerated.  The patient has had a history of Crohn's disease and Crohn's involving the rectal anal area better with mercaptopurine.  Patient previously had been seen by Dr. Josué meier at [facility] for Crohn's disease diagnosis and treatment plan.  Also chronic kidney disease and history of syncope related to Shy-Drager syndrome and orthostatic hypotension.    ROS: A comprehensive review of systems was performed and was otherwise negative    Medications:  Prior to Admission medications    Medication Sig Start Date End Date Taking? Authorizing Provider   calcium-vitamin D (CALCIUM-VITAMIN D) 500 mg(1,250mg) -200 unit per tablet Take 1 tablet by mouth daily.   Yes PROVIDER, HISTORICAL   carbidopa-levodopa (SINEMET CR)  mg per tablet Take 0.5 tablets by mouth bedtime.    Yes PROVIDER, HISTORICAL    carbidopa-levodopa (SINEMET)  mg per tablet Take 1.5 tablets by mouth 4 (four) times a day.    Yes Faizan Villagomez MD   cholecalciferol, vitamin D3, 1,000 unit tablet Take 3,000 Units by mouth daily.    Yes PROVIDER, HISTORICAL   donepezil (ARICEPT) 10 MG tablet Take 10 mg by mouth daily.   Yes Faizna Villagomez MD   melatonin 3 mg Tab tablet Take 3 mg by mouth bedtime.   Yes PROVIDER, HISTORICAL   mercaptopurine (PURINETHOL) 50 mg tablet TAKE ONE TABLET BY MOUTH ONE TIME DAILY  5/7/18  Yes Shaun Serra MD   midodrine (PROAMATINE) 2.5 MG tablet TAKE ONE TABLET BY MOUTH THREE TIMES DAILY 11/28/17  Yes Faizan Villagomez MD   QUEtiapine (SEROQUEL) 25 MG tablet Take 25 mg by mouth 2 (two) times a day. Take 0.5 at 11:00 and one tablet at bedtime    Yes Faizan Villagomez MD   selegiline (ELDEPRYL) 5 mg tablet Take 5 mg by mouth 2 (two) times a day with meals.   Yes Faizan Villagomez MD   simvastatin (ZOCOR) 20 MG tablet TAKE 1/2 TABLET BY MOUTH EVERY DAY 12/26/17  Yes Faizan Villagomez MD   urea (CARMOL) 40 % Crea Apply topically daily.    PROVIDER, HISTORICAL   mercaptopurine (PURINETHOL) 50 mg tablet TAKE ONE TABLET BY MOUTH ONE TIME DAILY  5/9/18 8/21/18  Faizan Villagomez MD       Allergies:   Allergies   Allergen Reactions     Grass Pollen Rash     Itchy eyes.     Meperidine Other (See Comments)     Confusion.     Lexapro [Escitalopram]      Lowered blood pressure     Memantine Rash     Pramipexole      Lowered blood pressure     Sertraline      Lowered blood pressusre     Neomycin Rash       Immunizations:   Immunization History   Administered Date(s) Administered     Influenza high dose, seasonal 12/07/2015, 10/03/2016, 12/29/2017     Influenza, inj, historic,unspecified 10/23/2008, 09/30/2011     Influenza, seasonal,quad inj 6-35 mos 10/29/2012, 11/07/2013, 10/08/2014     Pneumo Conj 13-V (2010&after) 12/07/2015     Pneumo Polysac 23-V 09/30/2011     Tdap 06/01/2010       Exam  wheelchair-bound speech is unintelligible.  There is no tremor there is rigidity in her upper extremities to passive range of motion.  Chest is clear heart tones regular rhythm there are no definite carotid bruits abdomen soft nontender lower extremities are free of edema cyanosis or clubbing she is slightly pale scattered seborrheic keratosis and graying of hair noted.    Assessment and Plan  Lewy body dementia with Parkinson's disease and Shy-Drager syndrome with multiple drug allergies and history of Crohn's disease on immunosuppressive therapy for Crohn's disease associated with the perianal rectal area Dr. Josué Cole presiding.  [facility].    Chronic kidney disease moderate stage III needs comprehensive full physical exam with labs.  Patient declined laboratory testing today.    Time: total time spent with the patient was 25 minutes of which >50% was spent in counseling and coordination of care    The following high BMI interventions were performed this visit: encouragement to exercise    Faizan Villagomez MD    Patient Active Problem List   Diagnosis     Parkinsonian syndrome with dementia and symptomatic orthostatic hypotension     Crohn's (Granulomatous) Colitis     Lewy body dementia     Chronic kidney disease, stage III (moderate)     Syncope

## 2021-06-23 NOTE — TELEPHONE ENCOUNTER
RN cannot approve Refill Request    RN can NOT refill this medication med is not covered by policy/route to provider. Last office visit: 8/21/2018 Faizan Villagomez MD Last Physical: 12/19/2017 Last MTM visit: Visit date not found Last visit same specialty: 8/21/2018 Faizan Villagomez MD.  Next visit within 3 mo: Visit date not found  Next physical within 3 mo: Visit date not found      Opal Bhatia, [facility] Triage/Med Refill 2/2/2019    Requested Prescriptions   Pending Prescriptions Disp Refills     mercaptopurine (PURINETHOL) 50 mg tablet [Pharmacy Med Name: Mercaptopurine Oral Tablet 50 MG] 90 tablet 0     Sig: TAKE ONE TABLET BY MOUTH ONE TIME DAILY    There is no refill protocol information for this order

## 2021-06-23 NOTE — TELEPHONE ENCOUNTER
Prior Authorization Request  Who s requesting:  Patient's  Reinaldo  Pharmacy Name and Location: [facility]  Medication Name: mercaptopurine 50 mg  Insurance Plan: Medicare Blue Part D  Insurance Member ID Number:  [id number]   Medical [facility] [id number]  Informed patient that prior authorizations can take up to 10 business days for response:   Yes  Okay to leave a detailed message: No    Patient's  Reinaldo applied for a tier reduction with [facility] today. [facility] has faxed questionnaire to HE.

## 2021-06-24 PROBLEM — K50.90 REGIONAL ENTERITIS (H): Status: ACTIVE | Noted: 2017-11-30

## 2021-06-25 NOTE — PROGRESS NOTES
Jerica is a 75 year old who is being evaluated via a billable telephone visit.      What phone number would you like to be contacted at? [phone]  How would you like to obtain your AVS? Counterceptst  Phone call duration: -- see below --- minutes

## 2021-06-25 NOTE — PATIENT INSTRUCTIONS
Medications     1030am 230/3pm 630/7pm 10pm 1230am   Calcium carb-cholecalciferol calcium 500 + D 500+400 mg-unit Cutting up so easier to swallow           Carbidopa/levodopa Sinemet CR 50/200         1/2   Carbidopa/levodopa Sinemet  1.5 1.5 1.5 1     Donepezil aricept 10mg       1     Melatonin 3mg       1or 2      Mercaptopurine purinethol 50mg tablet chemo 1           Midodrine proamatine 2.5mg 1 1 1       Quetiapine seroquel 25mg   1     1   Selegiline eldepryl 5mg 1 1         Simvastatin zocor 20mg         1/2   Vitamin D3 2000 units 50 mcg 1

## 2021-06-25 NOTE — TELEPHONE ENCOUNTER
Rx Authorization:    Requested Medication/ Dose  Selegiline HCl Oral Tablet 5 MG    Date last refill ordered: 6/23/2020    Quantity ordered: 180 tabs    # refills: 3    Date of last clinic visit with ordering provider: 6/23/2020    Date of next clinic visit with ordering provider: 6/25/21    All pertinent protocol data (lab date/result):     Include pertinent information from patients message:

## 2021-06-25 NOTE — TELEPHONE ENCOUNTER
RN cannot approve Refill Request    RN can NOT refill this medication PCP messaged that patient is overdue for Office Visit. Last office visit: 8/21/2018 Faizan Villagomez MD Last Physical: 12/19/2017 Last MTM visit: Visit date not found Last visit same specialty: 8/21/2018 Faizan Villagomez MD.  Next visit within 3 mo: Visit date not found  Next physical within 3 mo: Visit date not found      Bridgette Pereira, [facility] Triage/Med Refill 6/11/2021    Requested Prescriptions   Pending Prescriptions Disp Refills     simvastatin (ZOCOR) 20 MG tablet [Pharmacy Med Name: Simvastatin Oral Tablet 20 MG] 45 tablet 0     Sig: TAKE 1/2 TABLET BY MOUTH EVERY DAY       Statins Refill Protocol (Hmg CoA Reductase Inhibitors) Failed - 6/10/2021 10:33 AM        Failed - PCP or prescribing provider visit in past 12 months      Last office visit with prescriber/PCP: 8/21/2018 Faizan Villagomez MD OR same dept: Visit date not found OR same specialty: 8/21/2018 Faizan Villagomez MD  Last physical: 12/19/2017 Last MTM visit: Visit date not found   Next visit within 3 mo: Visit date not found  Next physical within 3 mo: Visit date not found  Prescriber OR PCP: Faizan Villagomez MD  Last diagnosis associated with med order: 1. Hyperlipidemia  - simvastatin (ZOCOR) 20 MG tablet [Pharmacy Med Name: Simvastatin Oral Tablet 20 MG]; TAKE 1/2 TABLET BY MOUTH EVERY DAY  Dispense: 45 tablet; Refill: 0    If protocol passes may refill for 12 months if within 3 months of last provider visit (or a total of 15 months).

## 2021-06-25 NOTE — LETTER
6/25/2021       RE: Jerica Blas  [address]  [place] [address]     Dear Colleague,    Thank you for referring your patient, Jerica Bals, to the [facility] [place] [facility]. Please see a copy of my visit note below.        VIDEO VISIT    Date of Visit: June 25, 2021  Name: Jerica Blas  Date of Birth 1945  [place] [address]  [phone] (H)  [email or web address]  Has mychart     No proxy  Lily Poe daughter  Reinaldo Najera   [phone]      Assessment:  (G20) Parkinson's disease (H)  (primary encounter diagnosis)  Carbidopa/levodopa Sinemet CR 50/200  Carbidopa/levodopa Sinemet   Selegiline eldepryl 5mg    Gait/Med related complications/Falls     Exercise/Therapy     Cognitive/Driving  Donepezil aricept 10mg    Mood     Hallucinations/delusions  Quetiapine seroquel 25mg    Sleep  Melatonin 3mg    Bladder     GI/Constipation/GERD     ENDO  Calcium carb-cholecalciferol calcium 500 + D 500+400 mg-unit  Simvastatin zocor 20mg  Vitamin D3 2000 units 50 mcg    Cardio/heart  Midodrine proamatine 2.5mg 3/day  Has not fainted for quit a while      Vision     Heme     Other:    Got a shot - vaccine    Mercaptopurine purinethol 50mg tablet chemo    Medications     1030am 230/3pm 630/7pm 10pm 1230am   Calcium carb-cholecalciferol calcium 500 + D 500+400 mg-unit Cutting up so easier to swallow           Carbidopa/levodopa Sinemet CR 50/200         1/2   Carbidopa/levodopa Sinemet  1.5 1.5 1.5 1     Donepezil aricept 10mg       1     Melatonin 3mg       1or 2      Mercaptopurine purinethol 50mg tablet chemo 1           Midodrine proamatine 2.5mg 1 1 1       Quetiapine seroquel 25mg   1     1   Selegiline eldepryl 5mg 1 1         Simvastatin zocor 20mg         1/2   Vitamin D3 2000 units 50 mcg 1           Plan:    No change in medication regimen  Refills made    Medical Decision Making:  #  Chronic  "progressive medical conditions addressed  bp and parkinsonism  Review and/or interpretation of unique test or documentation from a provider outside of neurology no   Independent historian provided additional details  yes   Prescription drug management and review of potential side effects and/or monitoring for side effects  yes   Health impacted by social determinants of health  Yes - home bound    I have reviewed the note as documented above.  This accurately captures the substance of my conversation with the patient and total time spent preparing for visit, executing visit and completing visit on the day of the visit:  20 minutes.     Start phone phone call 330pm  End of phone call 350pm     Ray Bravo MD      ------------------------------------------------------------------------------------------------------------------------------------------------------------------------    Video-Visit Details    The patient has been notified of following:     \"After a review of the patient s situation, this visit was changed from an in-person visit to a video visit to reduce the risk of COVID-19 exposure.   The patient is being evaluated via a billable video visit.\"    \"This video visit will be conducted via a call between you and your physician/provider. We have found that certain health care needs can be provided without the need for an in-person physical exam.  This service lets us provide the care you need with a video conversation.  If a prescription is necessary we can send it directly to your pharmacy.  If lab work is needed we can place an order for that and you can then stop by our lab to have the test done at a later time.    If during the course of the call the physician/provider feels a video visit is not appropriate, you will not be charged for this service.\"     Patient has given verbal consent for Video visit? Yes    Patient would like the video invitation sent by:     Type of service:  Video Visit    Video " Start Time:     Video End Time (time video stopped):     Duration:  minutes - see above    Originating Location (pt. Location):     Distant Location (provider location):  [facility] [place]     Mode of Communication:  Video Conference via Leaky (and if not possible then doximity)      Ray Bravo MD      --------------------------------------------------------------------------------------------------------------    Jerica Blas is a 75 year old female who is being evaluated via a billable video visit.      Charts reviewed  Consult from  Images reviewed        I have reviewed and updated the patient's Past Medical History, Social History, Family History and Medication List.    ALLERGIES  Citalopram, Sertraline, Meperidine, No clinical screening - see comments, Demerol, Hay fever & [a.r.m.], Mirapex [pramipexole], Namenda [memantine hydrochloride], Requip [ropinirole hydrochloride], Ropinirole, Memantine, and Neomycin    Lasts visit details if there was a last visit:       14 Review of systems  are negative except for   Patient Active Problem List   Diagnosis     Parkinsonism (H)     Dementia (H)     Crohn disease (H)     Fistula     Nocturia     REM sleep behavior disorder     Vitamin D deficiency     Wears glasses     Glaucoma     Hypercholesteremia     Constipation     Finger fracture     Foot fracture     Confusion     Bunion     Cracked skin     Hand pain     Macrocytosis     Chronic renal disease     Chronic kidney disease, stage III (moderate)     Regional enteritis (H)     Lewy body dementia (H)     Parkinsonian syndrome associated with symptomatic orthostatic hypotension (H)     Syncope        Allergies   Allergen Reactions     Citalopram Other (See Comments)     Fainting     Sertraline Other (See Comments)     Fainting     Meperidine Unknown     Other reaction(s): Other (See Comments)  Confusion.     No Clinical Screening - See Comments Rash     Itchy eyes.     Demerol       Hay Fever & [A.R.M.]      Mirapex [Pramipexole] Other (See Comments)     Hallucinations     Namenda [Memantine Hydrochloride]      No benefit       Requip [Ropinirole Hydrochloride]      ? confusion     Ropinirole Unknown     Memantine Rash     Neomycin Rash     Past Surgical History:   Procedure Laterality Date     BIOPSY OF SKIN LESION  october 2014    seborrheic keratosis of left face     EYE SURGERY  jan 2012    left cataract     EYE SURGERY  march 2012    right cataract     fistula repair?       Past Medical History:   Diagnosis Date     Bunion 3/27/2012     Chronic renal disease 9/24/2013     Confusion 9/6/2011     Constipation     1/week     Constipation 9/6/2011     Cracked skin 3/27/2012     Crohn disease (H) 9/2/2011     Crohn's     mercaptopurine     Disorientation     denies hallucinations. Has memory problems     Entero-colonic fistula 2000    related to crohns; [facility]; had a prior one      Falls     once every 3 months     Finger fracture 9/6/2011     Finger fracture, right      Fistula 9/2/2011     Foot fracture      Freezing      Glaucoma     uses travatan and timolol     Glaucoma 9/6/2011     Hand pain 3/27/2012     Hypercholesteremia 9/6/2011     Hypercholesterolaemia     on simvastatin     Macrocytosis 9/24/2013     Nocturia     1-3/noc     Over weight     with pd has gained 55 lbs     Parkinson's disease 1998    left side onset; left handed. dyskinesias     REM sleep behavior disorder     not frequent     Screening colonoscopy 2011     Vitamin D deficiency      Wears glasses      Social History     Socioeconomic History     Marital status:      Spouse name: Not on file     Number of children: Not on file     Years of education: Not on file     Highest education level: Not on file   Occupational History     Not on file   Social Needs     Financial resource strain: Not on file     Food insecurity     Worry: Not on file     Inability: Not on file     Transportation needs     Medical:  Not on file     Non-medical: Not on file   Tobacco Use     Smoking status: Never Smoker     Smokeless tobacco: Never Used   Substance and Sexual Activity     Alcohol use: No     Drug use: No     Sexual activity: Not Currently   Lifestyle     Physical activity     Days per week: Not on file     Minutes per session: Not on file     Stress: Not on file   Relationships     Social connections     Talks on phone: Not on file     Gets together: Not on file     Attends Oriental orthodox service: Not on file     Active member of club or organization: Not on file     Attends meetings of clubs or organizations: Not on file     Relationship status: Not on file     Intimate partner violence     Fear of current or ex partner: Not on file     Emotionally abused: Not on file     Physically abused: Not on file     Forced sexual activity: Not on file   Other Topics Concern     Parent/sibling w/ CABG, MI or angioplasty before 65F 55M? No   Social History Narrative    Living in [place]    Not exercising    No tobacco    No alcohol        FAMILY HISTORY:  Her father  in his 70s from alcoholism.  Her mother is alive and age 94.  She has two sisters and one brother who are healthy.  She has two daughters who are 33 and 35.  She is of [place] background.        SOCIAL HISTORY:  She was born in [place] and grew up in the [place] and then [place].  She came to [place] after college.  She has been  since , i.e., 39 years.  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     Family History   Problem Relation Age of Onset     Heart Disease Mother         94 and alive     Cerebrovascular Disease Mother      Alcohol/Drug Father         70s  in 80s; [place] ; [place]; [place]     Current Outpatient Medications   Medication Sig Dispense Refill     Calcium Carb-Cholecalciferol (CALCIUM 500 +D) 500-400 MG-UNIT TABS Cutting into 1/2 tabs and taking both 1/2 tabs @ 1030am = 1  tab per day       carbidopa-levodopa (SINEMET CR)  MG CR tablet 1/2 TABLET BY MOUTH@ 1230am 45 tablet 3     carbidopa-levodopa (SINEMET)  MG tablet take 1 &1/2 TABLETS by mouth AT 10:30AM, 2:30-3PM, 6:30-7PM AND 1 TABLET AT 10PM 495 tablet 1     donepezil (ARICEPT) 10 MG tablet 10mg tab by mouth nightly @ 10pm 90 tablet 11     melatonin 3 MG tablet 3mg tab by mouth nightly @ 10pm 90 tablet 3     mercaptopurine (PURINETHOL) 50 MG tablet CHEMO 50mg tab by mouth daily @ 1030am 90 tablet 3     midodrine (PROAMATINE) 2.5 MG tablet 2.5mg tab by mouth 3/day @ 1030am, 230/3pm and 630/7pm and extra 2.5mg tab by mouth daily as needed = 4/day 360 tablet 3     QUEtiapine (SEROQUEL) 25 MG tablet Take 1 tablet (25 mg) by mouth at 230-3pm and 1 tablet by mouth at 1230am 180 tablet 1     selegiline 5 MG tablet take 1 tablet by mouth twice daily @ 10:30am and 2:30-3pm 180 tablet 3     simvastatin (ZOCOR) 20 MG tablet 1/2 x 20mgtab @ 1230am 90 tablet 3     vitamin D3 (CHOLECALCIFEROL) 2000 units (50 mcg) tablet 2000 unit tab by mouth daily @ 1030am           Medications                                                                                                                                                                                                                  Jerica is a 75 year old who is being evaluated via a billable telephone visit.      What phone number would you like to be contacted at? [phone]  How would you like to obtain your AVS? Michael  Phone call duration: -- see below --- minutes        Again, thank you for allowing me to participate in the care of your patient.      Sincerely,    Ray Bravo MD

## 2021-06-28 NOTE — TELEPHONE ENCOUNTER
Jerica received a 90 day supply with 3 refills on Selegiline 5 mg tablets on 6/25/2021. No new prescription needed.

## 2021-07-03 NOTE — ADDENDUM NOTE
Addendum Note by Alex Morales MLT at 12/19/2017 10:26 AM     Author: Alex Morales MLT Service: -- Author Type:     Filed: 12/19/2017 10:26 AM Encounter Date: 12/19/2017 Status: Signed    : Alex Morales MLT ()    Addended by: ALEX MORALES on: 12/19/2017 10:26 AM        Modules accepted: Orders

## 2021-09-06 NOTE — TELEPHONE ENCOUNTER
"  Disp Refills Start End LUANA    simvastatin (ZOCOR) 20 MG tablet 45 tablet 0 6/11/2021  No   Sig: TAKE 1/2 TABLET BY MOUTH EVERY DAY   Sent to pharmacy as: simvastatin 20 mg tablet (ZOCOR)   E-Prescribing Status: Receipt confirmed by pharmacy (6/11/2021  1:01 PM CDT)   simvastatin (ZOCOR) 20 MG tablet [[id number]]    Electronically signed by: Faizan Villagomez MD on 06/11/21 1301 Status: Active   Ordering user: Faizan Villagomez MD 06/11/21 1301 Authorized by: Faizan Villagomez MD   Frequency:  06/11/21 - Until Discontinued Released by: Faizan Villagomez MD 06/11/21 1301   Diagnoses  Hyperlipidemia [E78.5]       Routing refill request to provider for review/approval because:  Labs not current:  LDL  Patient needs to be seen because it has been more than 1 year since last office visit.    Last Written Prescription Date:  6/11/21  Last Fill Quantity: 45,  # refills: 0   Last office visit provider:  8/21/18     Requested Prescriptions   Pending Prescriptions Disp Refills     simvastatin (ZOCOR) 20 MG tablet [Pharmacy Med Name: Simvastatin Oral Tablet 20 MG] 45 tablet 0     Sig: TAKE 1/2 TABLET BY MOUTH EVERY DAY       Statins Protocol Failed - 9/6/2021  2:00 AM        Failed - LDL on file in past 12 months     Recent Labs   Lab Test 06/27/19  1502   LDL 59             Failed - Recent (12 mo) or future (30 days) visit within the authorizing provider's specialty     Patient has had an office visit with the authorizing provider or a provider within the authorizing providers department within the previous 12 mos or has a future within next 30 days. See \"Patient Info\" tab in inbasket, or \"Choose Columns\" in Meds & Orders section of the refill encounter.              Passed - No abnormal creatine kinase in past 12 months     No lab results found.             Passed - Medication is active on med list        Passed - Patient is age 18 or older        Passed - No active pregnancy on record        Passed - No positive " pregnancy test in past 12 months             Clark Clifford RN 09/06/21 8:36 AM

## 2021-10-15 NOTE — TELEPHONE ENCOUNTER
Routing refill request to provider for review/approval because:  Drug not on the G refill protocol   OV > 12 months ago    Last Written Prescription Date:  7/19/21  Last Fill Quantity: 90,  # refills: 0   Last office visit provider:  8/21/18     Requested Prescriptions   Pending Prescriptions Disp Refills     mercaptopurine (PURINETHOL) 50 MG tablet [Pharmacy Med Name: Mercaptopurine Oral Tablet 50 MG] 90 tablet 0     Sig: TAKE ONE TABLET BY MOUTH ONE TIME DAILY       There is no refill protocol information for this order          denilson dean RN 10/15/21 3:34 PM

## 2021-12-05 NOTE — TELEPHONE ENCOUNTER
"Routing refill request to provider for review/approval because:  Labs not current:  LDL    Last Written Prescription Date:  9/7/21  Last Fill Quantity: 45,  # refills: 0   Last office visit provider:  10/18/21     Requested Prescriptions   Pending Prescriptions Disp Refills     simvastatin (ZOCOR) 20 MG tablet [Pharmacy Med Name: Simvastatin Oral Tablet 20 MG] 45 tablet 0     Sig: TAKE 1/2 TABLET BY MOUTH EVERY DAY       Statins Protocol Failed - 12/3/2021 10:32 AM        Failed - LDL on file in past 12 months     Recent Labs   Lab Test 06/27/19  1502   LDL 59             Passed - No abnormal creatine kinase in past 12 months     No lab results found.             Passed - Recent (12 mo) or future (30 days) visit within the authorizing provider's specialty     Patient has had an office visit with the authorizing provider or a provider within the authorizing providers department within the previous 12 mos or has a future within next 30 days. See \"Patient Info\" tab in inbasket, or \"Choose Columns\" in Meds & Orders section of the refill encounter.              Passed - Medication is active on med list        Passed - Patient is age 18 or older        Passed - No active pregnancy on record        Passed - No positive pregnancy test in past 12 months             Clark Clifford RN 12/05/21 10:36 AM  "

## 2022-01-11 ENCOUNTER — TELEPHONE (OUTPATIENT)
Dept: INTERNAL MEDICINE | Facility: CLINIC | Age: 77
End: 2022-01-11
Payer: MEDICARE

## 2022-01-11 NOTE — TELEPHONE ENCOUNTER
Chucho with [facility] calling.  Pt  on 2022.  Requesting a call back, needs to confirm Dr. Villagomez will sign death certificate.   Call back number is